# Patient Record
Sex: FEMALE | Race: WHITE | NOT HISPANIC OR LATINO | Employment: OTHER | URBAN - METROPOLITAN AREA
[De-identification: names, ages, dates, MRNs, and addresses within clinical notes are randomized per-mention and may not be internally consistent; named-entity substitution may affect disease eponyms.]

---

## 2023-03-21 ENCOUNTER — HOSPITAL ENCOUNTER (OUTPATIENT)
Facility: HOSPITAL | Age: 88
Setting detail: OBSERVATION
LOS: 2 days | Discharge: HOME | End: 2023-03-28
Attending: EMERGENCY MEDICINE | Admitting: HOSPITALIST
Payer: MEDICARE

## 2023-03-21 ENCOUNTER — APPOINTMENT (OUTPATIENT)
Dept: CT IMAGING | Facility: HOSPITAL | Age: 88
End: 2023-03-21
Payer: MEDICARE

## 2023-03-21 DIAGNOSIS — Z87.39 HISTORY OF RHEUMATOID ARTHRITIS: ICD-10-CM

## 2023-03-21 DIAGNOSIS — Z86.79 HISTORY OF HYPERTENSION: ICD-10-CM

## 2023-03-21 DIAGNOSIS — D72.829 LEUKOCYTOSIS, UNSPECIFIED TYPE: ICD-10-CM

## 2023-03-21 DIAGNOSIS — Z86.39 HISTORY OF DIABETES MELLITUS: ICD-10-CM

## 2023-03-21 DIAGNOSIS — K57.92 ACUTE DIVERTICULITIS: Primary | ICD-10-CM

## 2023-03-21 DIAGNOSIS — R10.32 LEFT LOWER QUADRANT ABDOMINAL PAIN: ICD-10-CM

## 2023-03-21 DIAGNOSIS — Z87.448 HISTORY OF CHRONIC KIDNEY DISEASE: ICD-10-CM

## 2023-03-21 LAB
ALBUMIN SERPL-MCNC: 3.9 G/DL (ref 3.5–5.2)
ALBUMIN/GLOB SERPL: 1.7 G/DL
ALP SERPL-CCNC: 67 U/L (ref 39–117)
ALT SERPL W P-5'-P-CCNC: 15 U/L (ref 1–33)
ANION GAP SERPL CALCULATED.3IONS-SCNC: 9.2 MMOL/L (ref 5–15)
AST SERPL-CCNC: 36 U/L (ref 1–32)
BACTERIA UR QL AUTO: NORMAL /HPF
BASOPHILS # BLD AUTO: 0.07 10*3/MM3 (ref 0–0.2)
BASOPHILS NFR BLD AUTO: 0.3 % (ref 0–1.5)
BILIRUB SERPL-MCNC: 0.5 MG/DL (ref 0–1.2)
BILIRUB UR QL STRIP: NEGATIVE
BUN SERPL-MCNC: 25 MG/DL (ref 8–23)
BUN/CREAT SERPL: 19.8 (ref 7–25)
CALCIUM SPEC-SCNC: 9.1 MG/DL (ref 8.6–10.5)
CHLORIDE SERPL-SCNC: 104 MMOL/L (ref 98–107)
CLARITY UR: CLEAR
CO2 SERPL-SCNC: 24.8 MMOL/L (ref 22–29)
COLOR UR: ABNORMAL
CREAT SERPL-MCNC: 1.26 MG/DL (ref 0.57–1)
D-LACTATE SERPL-SCNC: 1.8 MMOL/L (ref 0.5–2)
DEPRECATED RDW RBC AUTO: 44.7 FL (ref 37–54)
EGFRCR SERPLBLD CKD-EPI 2021: 41.7 ML/MIN/1.73
EOSINOPHIL # BLD AUTO: 0.15 10*3/MM3 (ref 0–0.4)
EOSINOPHIL NFR BLD AUTO: 0.7 % (ref 0.3–6.2)
ERYTHROCYTE [DISTWIDTH] IN BLOOD BY AUTOMATED COUNT: 13.4 % (ref 12.3–15.4)
GLOBULIN UR ELPH-MCNC: 2.3 GM/DL
GLUCOSE BLDC GLUCOMTR-MCNC: 128 MG/DL (ref 70–130)
GLUCOSE SERPL-MCNC: 148 MG/DL (ref 65–99)
GLUCOSE UR STRIP-MCNC: NEGATIVE MG/DL
HCT VFR BLD AUTO: 39.8 % (ref 34–46.6)
HGB BLD-MCNC: 13.4 G/DL (ref 12–15.9)
HGB UR QL STRIP.AUTO: NEGATIVE
HYALINE CASTS UR QL AUTO: NORMAL /LPF
KETONES UR QL STRIP: NEGATIVE
LEUKOCYTE ESTERASE UR QL STRIP.AUTO: NEGATIVE
LIPASE SERPL-CCNC: 40 U/L (ref 13–60)
LYMPHOCYTES # BLD AUTO: 0.95 10*3/MM3 (ref 0.7–3.1)
LYMPHOCYTES NFR BLD AUTO: 4.3 % (ref 19.6–45.3)
MAGNESIUM SERPL-MCNC: 2.1 MG/DL (ref 1.6–2.4)
MCH RBC QN AUTO: 31.2 PG (ref 26.6–33)
MCHC RBC AUTO-ENTMCNC: 33.7 G/DL (ref 31.5–35.7)
MCV RBC AUTO: 92.6 FL (ref 79–97)
MONOCYTES # BLD AUTO: 0.75 10*3/MM3 (ref 0.1–0.9)
MONOCYTES NFR BLD AUTO: 3.4 % (ref 5–12)
NEUTROPHILS NFR BLD AUTO: 20.14 10*3/MM3 (ref 1.7–7)
NEUTROPHILS NFR BLD AUTO: 90.7 % (ref 42.7–76)
NITRITE UR QL STRIP: NEGATIVE
PH UR STRIP.AUTO: 5.5 [PH] (ref 5–8)
PLATELET # BLD AUTO: 120 10*3/MM3 (ref 140–450)
PMV BLD AUTO: 10.3 FL (ref 6–12)
POTASSIUM SERPL-SCNC: 4.2 MMOL/L (ref 3.5–5.2)
PROT SERPL-MCNC: 6.2 G/DL (ref 6–8.5)
PROT UR QL STRIP: ABNORMAL
RBC # BLD AUTO: 4.3 10*6/MM3 (ref 3.77–5.28)
RBC # UR STRIP: NORMAL /HPF
REF LAB TEST METHOD: NORMAL
SODIUM SERPL-SCNC: 138 MMOL/L (ref 136–145)
SP GR UR STRIP: 1.02 (ref 1–1.03)
SQUAMOUS #/AREA URNS HPF: NORMAL /HPF
UROBILINOGEN UR QL STRIP: ABNORMAL
WBC # UR STRIP: NORMAL /HPF
WBC NRBC COR # BLD: 22.2 10*3/MM3 (ref 3.4–10.8)

## 2023-03-21 PROCEDURE — 25010000002 MORPHINE PER 10 MG: Performed by: EMERGENCY MEDICINE

## 2023-03-21 PROCEDURE — 99221 1ST HOSP IP/OBS SF/LOW 40: CPT | Performed by: SURGERY

## 2023-03-21 PROCEDURE — G0378 HOSPITAL OBSERVATION PER HR: HCPCS

## 2023-03-21 PROCEDURE — 87040 BLOOD CULTURE FOR BACTERIA: CPT | Performed by: EMERGENCY MEDICINE

## 2023-03-21 PROCEDURE — 82962 GLUCOSE BLOOD TEST: CPT

## 2023-03-21 PROCEDURE — 99284 EMERGENCY DEPT VISIT MOD MDM: CPT

## 2023-03-21 PROCEDURE — 83605 ASSAY OF LACTIC ACID: CPT | Performed by: EMERGENCY MEDICINE

## 2023-03-21 PROCEDURE — 80053 COMPREHEN METABOLIC PANEL: CPT | Performed by: EMERGENCY MEDICINE

## 2023-03-21 PROCEDURE — 83690 ASSAY OF LIPASE: CPT | Performed by: EMERGENCY MEDICINE

## 2023-03-21 PROCEDURE — 96365 THER/PROPH/DIAG IV INF INIT: CPT

## 2023-03-21 PROCEDURE — 81001 URINALYSIS AUTO W/SCOPE: CPT | Performed by: EMERGENCY MEDICINE

## 2023-03-21 PROCEDURE — 96375 TX/PRO/DX INJ NEW DRUG ADDON: CPT

## 2023-03-21 PROCEDURE — 74177 CT ABD & PELVIS W/CONTRAST: CPT

## 2023-03-21 PROCEDURE — P9612 CATHETERIZE FOR URINE SPEC: HCPCS

## 2023-03-21 PROCEDURE — 83735 ASSAY OF MAGNESIUM: CPT | Performed by: EMERGENCY MEDICINE

## 2023-03-21 PROCEDURE — 25510000001 IOPAMIDOL 61 % SOLUTION: Performed by: EMERGENCY MEDICINE

## 2023-03-21 PROCEDURE — 85025 COMPLETE CBC W/AUTO DIFF WBC: CPT | Performed by: EMERGENCY MEDICINE

## 2023-03-21 PROCEDURE — 96361 HYDRATE IV INFUSION ADD-ON: CPT

## 2023-03-21 PROCEDURE — 25010000002 PIPERACILLIN SOD-TAZOBACTAM PER 1 G: Performed by: EMERGENCY MEDICINE

## 2023-03-21 RX ORDER — PANTOPRAZOLE SODIUM 40 MG/1
40 TABLET, DELAYED RELEASE ORAL
Status: DISCONTINUED | OUTPATIENT
Start: 2023-03-22 | End: 2023-03-28 | Stop reason: HOSPADM

## 2023-03-21 RX ORDER — LOSARTAN POTASSIUM 50 MG/1
50 TABLET ORAL
Status: DISCONTINUED | OUTPATIENT
Start: 2023-03-22 | End: 2023-03-28 | Stop reason: HOSPADM

## 2023-03-21 RX ORDER — MEMANTINE HYDROCHLORIDE 5 MG/1
5 TABLET ORAL DAILY
Status: DISCONTINUED | OUTPATIENT
Start: 2023-03-22 | End: 2023-03-28 | Stop reason: HOSPADM

## 2023-03-21 RX ORDER — LEVOTHYROXINE SODIUM 0.03 MG/1
25 TABLET ORAL
Status: DISCONTINUED | OUTPATIENT
Start: 2023-03-22 | End: 2023-03-28 | Stop reason: HOSPADM

## 2023-03-21 RX ORDER — CALCITRIOL 0.25 UG/1
0.25 CAPSULE, LIQUID FILLED ORAL DAILY
Status: DISCONTINUED | OUTPATIENT
Start: 2023-03-22 | End: 2023-03-22

## 2023-03-21 RX ORDER — ONDANSETRON 4 MG/1
4 TABLET, FILM COATED ORAL EVERY 6 HOURS PRN
Status: DISCONTINUED | OUTPATIENT
Start: 2023-03-21 | End: 2023-03-28 | Stop reason: HOSPADM

## 2023-03-21 RX ORDER — MEMANTINE HYDROCHLORIDE 5 MG/1
5 TABLET ORAL 2 TIMES DAILY
COMMUNITY

## 2023-03-21 RX ORDER — AMLODIPINE BESYLATE 5 MG/1
5 TABLET ORAL DAILY
COMMUNITY

## 2023-03-21 RX ORDER — INSULIN LISPRO 100 [IU]/ML
0-9 INJECTION, SOLUTION INTRAVENOUS; SUBCUTANEOUS
Status: DISCONTINUED | OUTPATIENT
Start: 2023-03-22 | End: 2023-03-28 | Stop reason: HOSPADM

## 2023-03-21 RX ORDER — IBUPROFEN 600 MG/1
1 TABLET ORAL
Status: DISCONTINUED | OUTPATIENT
Start: 2023-03-21 | End: 2023-03-28 | Stop reason: HOSPADM

## 2023-03-21 RX ORDER — ACETAMINOPHEN 325 MG/1
650 TABLET ORAL EVERY 4 HOURS PRN
Status: DISCONTINUED | OUTPATIENT
Start: 2023-03-21 | End: 2023-03-28 | Stop reason: HOSPADM

## 2023-03-21 RX ORDER — ALBUTEROL SULFATE 2.5 MG/3ML
2.5 SOLUTION RESPIRATORY (INHALATION) EVERY 6 HOURS PRN
Status: DISCONTINUED | OUTPATIENT
Start: 2023-03-21 | End: 2023-03-28 | Stop reason: HOSPADM

## 2023-03-21 RX ORDER — LEFLUNOMIDE 20 MG/1
20 TABLET ORAL DAILY
Status: DISCONTINUED | OUTPATIENT
Start: 2023-03-22 | End: 2023-03-22

## 2023-03-21 RX ORDER — NICOTINE POLACRILEX 4 MG
15 LOZENGE BUCCAL
Status: DISCONTINUED | OUTPATIENT
Start: 2023-03-21 | End: 2023-03-28 | Stop reason: HOSPADM

## 2023-03-21 RX ORDER — ONDANSETRON 2 MG/ML
4 INJECTION INTRAMUSCULAR; INTRAVENOUS EVERY 6 HOURS PRN
Status: DISCONTINUED | OUTPATIENT
Start: 2023-03-21 | End: 2023-03-28 | Stop reason: HOSPADM

## 2023-03-21 RX ORDER — LEVOTHYROXINE SODIUM 0.03 MG/1
25 TABLET ORAL
COMMUNITY

## 2023-03-21 RX ORDER — SODIUM CHLORIDE 9 MG/ML
75 INJECTION, SOLUTION INTRAVENOUS CONTINUOUS
Status: DISCONTINUED | OUTPATIENT
Start: 2023-03-21 | End: 2023-03-24

## 2023-03-21 RX ORDER — AMLODIPINE BESYLATE 5 MG/1
5 TABLET ORAL
Status: DISCONTINUED | OUTPATIENT
Start: 2023-03-22 | End: 2023-03-28 | Stop reason: HOSPADM

## 2023-03-21 RX ORDER — MORPHINE SULFATE 2 MG/ML
2 INJECTION, SOLUTION INTRAMUSCULAR; INTRAVENOUS ONCE
Status: COMPLETED | OUTPATIENT
Start: 2023-03-21 | End: 2023-03-21

## 2023-03-21 RX ORDER — TIZANIDINE 4 MG/1
2 TABLET ORAL EVERY 6 HOURS PRN
COMMUNITY

## 2023-03-21 RX ORDER — TIZANIDINE 4 MG/1
4 TABLET ORAL EVERY 8 HOURS PRN
Status: DISCONTINUED | OUTPATIENT
Start: 2023-03-21 | End: 2023-03-28 | Stop reason: HOSPADM

## 2023-03-21 RX ORDER — UREA 10 %
3 LOTION (ML) TOPICAL NIGHTLY PRN
Status: DISCONTINUED | OUTPATIENT
Start: 2023-03-21 | End: 2023-03-28 | Stop reason: HOSPADM

## 2023-03-21 RX ORDER — ATORVASTATIN CALCIUM 20 MG/1
20 TABLET, FILM COATED ORAL NIGHTLY
Status: DISCONTINUED | OUTPATIENT
Start: 2023-03-21 | End: 2023-03-28 | Stop reason: HOSPADM

## 2023-03-21 RX ORDER — ASPIRIN 325 MG
325 TABLET, DELAYED RELEASE (ENTERIC COATED) ORAL DAILY
Status: DISCONTINUED | OUTPATIENT
Start: 2023-03-22 | End: 2023-03-22

## 2023-03-21 RX ORDER — DEXTROSE MONOHYDRATE 25 G/50ML
25 INJECTION, SOLUTION INTRAVENOUS
Status: DISCONTINUED | OUTPATIENT
Start: 2023-03-21 | End: 2023-03-28 | Stop reason: HOSPADM

## 2023-03-21 RX ADMIN — ATORVASTATIN CALCIUM 20 MG: 20 TABLET, FILM COATED ORAL at 22:13

## 2023-03-21 RX ADMIN — MORPHINE SULFATE 2 MG: 2 INJECTION, SOLUTION INTRAMUSCULAR; INTRAVENOUS at 19:41

## 2023-03-21 RX ADMIN — SODIUM CHLORIDE 75 ML/HR: 9 INJECTION, SOLUTION INTRAVENOUS at 22:13

## 2023-03-21 RX ADMIN — TAZOBACTAM SODIUM AND PIPERACILLIN SODIUM 3.38 G: 375; 3 INJECTION, SOLUTION INTRAVENOUS at 19:00

## 2023-03-21 RX ADMIN — METOPROLOL TARTRATE 125 MG: 25 TABLET, FILM COATED ORAL at 22:13

## 2023-03-21 RX ADMIN — IOPAMIDOL 100 ML: 612 INJECTION, SOLUTION INTRAVENOUS at 18:27

## 2023-03-21 RX ADMIN — Medication 3 MG: at 22:12

## 2023-03-21 NOTE — ED PROVIDER NOTES
EMERGENCY DEPARTMENT ENCOUNTER    Room Number:  N540/1  Date of encounter:  3/21/2023  PCP: Sabi Solis MD  Historian: Patient      HPI:  Chief Complaint: Abdominal pain  A complete HPI/ROS/PMH/PSH/SH/FH are unobtainable due to: None    Context: Martina Lara is a 87 y.o. female who presents to the ED via private vehicle for evaluation of 1 day of left lower quadrant abdominal pain, constant nature with no aggravating or alleviating factors.  Denies any new dysuria or urinary urgency or frequency, no diarrhea, no nausea or vomiting.  Had a soft large bowel movement earlier today with no aggravation or alleviation of pain.  Has been eating and drinking normally.  No fevers, cough, chest pain, shortness of breath.  Reports a prior partial colon resection in the past.  States that she took Tylenol earlier today with no significant relief.      MEDICAL RECORD REVIEW    External (non-ED) record review: Primary care office visit note reviewed from February 17, 2023, patient with a history of hypertension, stage IV chronic kidney disease, diabetes, rheumatoid arthritis on Plaquenil, atrial fibrillation, history of memory issues.     PAST MEDICAL HISTORY  Active Ambulatory Problems     Diagnosis Date Noted   • No Active Ambulatory Problems     Resolved Ambulatory Problems     Diagnosis Date Noted   • No Resolved Ambulatory Problems     No Additional Past Medical History         PAST SURGICAL HISTORY  No past surgical history on file.      FAMILY HISTORY  No family history on file.      SOCIAL HISTORY  Social History     Socioeconomic History   • Marital status: Single         ALLERGIES  Patient has no known allergies.        REVIEW OF SYSTEMS  Review of Systems     All systems reviewed and negative except for those discussed in HPI.       PHYSICAL EXAM    I have reviewed the triage vital signs and nursing notes.    ED Triage Vitals   Temp Heart Rate Resp BP SpO2   03/21/23 1652 03/21/23 1652 03/21/23 1652 03/21/23  1715 03/21/23 1652   98.4 °F (36.9 °C) 90 18 167/97 98 %      Temp src Heart Rate Source Patient Position BP Location FiO2 (%)   -- -- 03/21/23 1715 03/21/23 1715 --     Lying Right arm        Physical Exam  General: No acute distress, nontoxic,, nondiaphoretic  HEENT: Mucous membranes moist, atraumatic, EOMI  Neck: Full ROM  Pulm: Symmetric chest rise, nonlabored, lungs CTAB  Cardiovascular: Regular rate and rhythm, intact distal pulses  GI: Soft, mild left lower quadrant tenderness to palpation, nondistended, no rebound, no guarding, bowel sounds present  MSK: Full ROM, no deformity  Skin: Warm, dry  Neuro: Awake, alert, oriented x 4, GCS 15, moving all extremities, no focal deficits  Psych: Calm, cooperative      N95, protective eye goggles, and gloves used during this encounter. Patient in surgical mask.      LAB RESULTS  Recent Results (from the past 24 hour(s))   Comprehensive Metabolic Panel    Collection Time: 03/21/23  5:13 PM    Specimen: Blood   Result Value Ref Range    Glucose 148 (H) 65 - 99 mg/dL    BUN 25 (H) 8 - 23 mg/dL    Creatinine 1.26 (H) 0.57 - 1.00 mg/dL    Sodium 138 136 - 145 mmol/L    Potassium 4.2 3.5 - 5.2 mmol/L    Chloride 104 98 - 107 mmol/L    CO2 24.8 22.0 - 29.0 mmol/L    Calcium 9.1 8.6 - 10.5 mg/dL    Total Protein 6.2 6.0 - 8.5 g/dL    Albumin 3.9 3.5 - 5.2 g/dL    ALT (SGPT) 15 1 - 33 U/L    AST (SGOT) 36 (H) 1 - 32 U/L    Alkaline Phosphatase 67 39 - 117 U/L    Total Bilirubin 0.5 0.0 - 1.2 mg/dL    Globulin 2.3 gm/dL    A/G Ratio 1.7 g/dL    BUN/Creatinine Ratio 19.8 7.0 - 25.0    Anion Gap 9.2 5.0 - 15.0 mmol/L    eGFR 41.7 (L) >60.0 mL/min/1.73   Lipase    Collection Time: 03/21/23  5:13 PM    Specimen: Blood   Result Value Ref Range    Lipase 40 13 - 60 U/L   Lactic Acid, Plasma    Collection Time: 03/21/23  5:13 PM    Specimen: Blood   Result Value Ref Range    Lactate 1.8 0.5 - 2.0 mmol/L   Magnesium    Collection Time: 03/21/23  5:13 PM    Specimen: Blood   Result Value  Ref Range    Magnesium 2.1 1.6 - 2.4 mg/dL   CBC Auto Differential    Collection Time: 03/21/23  5:13 PM    Specimen: Blood   Result Value Ref Range    WBC 22.20 (H) 3.40 - 10.80 10*3/mm3    RBC 4.30 3.77 - 5.28 10*6/mm3    Hemoglobin 13.4 12.0 - 15.9 g/dL    Hematocrit 39.8 34.0 - 46.6 %    MCV 92.6 79.0 - 97.0 fL    MCH 31.2 26.6 - 33.0 pg    MCHC 33.7 31.5 - 35.7 g/dL    RDW 13.4 12.3 - 15.4 %    RDW-SD 44.7 37.0 - 54.0 fl    MPV 10.3 6.0 - 12.0 fL    Platelets 120 (L) 140 - 450 10*3/mm3    Neutrophil % 90.7 (H) 42.7 - 76.0 %    Lymphocyte % 4.3 (L) 19.6 - 45.3 %    Monocyte % 3.4 (L) 5.0 - 12.0 %    Eosinophil % 0.7 0.3 - 6.2 %    Basophil % 0.3 0.0 - 1.5 %    Neutrophils, Absolute 20.14 (H) 1.70 - 7.00 10*3/mm3    Lymphocytes, Absolute 0.95 0.70 - 3.10 10*3/mm3    Monocytes, Absolute 0.75 0.10 - 0.90 10*3/mm3    Eosinophils, Absolute 0.15 0.00 - 0.40 10*3/mm3    Basophils, Absolute 0.07 0.00 - 0.20 10*3/mm3   Urinalysis With Microscopic If Indicated (No Culture) - Straight Cath    Collection Time: 03/21/23  5:47 PM    Specimen: Straight Cath; Urine   Result Value Ref Range    Color, UA Dark Yellow (A) Yellow, Straw    Appearance, UA Clear Clear    pH, UA 5.5 5.0 - 8.0    Specific Gravity, UA 1.022 1.005 - 1.030    Glucose, UA Negative Negative    Ketones, UA Negative Negative    Bilirubin, UA Negative Negative    Blood, UA Negative Negative    Protein, UA 30 mg/dL (1+) (A) Negative    Leuk Esterase, UA Negative Negative    Nitrite, UA Negative Negative    Urobilinogen, UA 0.2 E.U./dL 0.2 - 1.0 E.U./dL   Urinalysis, Microscopic Only - Straight Cath    Collection Time: 03/21/23  5:47 PM    Specimen: Straight Cath; Urine   Result Value Ref Range    RBC, UA 0-2 None Seen, 0-2 /HPF    WBC, UA 0-2 None Seen, 0-2 /HPF    Bacteria, UA None Seen None Seen /HPF    Squamous Epithelial Cells, UA 0-2 None Seen, 0-2 /HPF    Hyaline Casts, UA 0-2 None Seen /LPF    Methodology Automated Microscopy    POC Glucose Once     Collection Time: 03/21/23  8:47 PM    Specimen: Blood   Result Value Ref Range    Glucose 128 70 - 130 mg/dL       Ordered the above labs and independently interpreted results. My findings will be discussed in the medical decision making section below        RADIOLOGY  CT Abdomen Pelvis With Contrast    Result Date: 3/21/2023  CT ABDOMEN PELVIS W CONTRAST-  INDICATIONS: Left lower quadrant pain  TECHNIQUE: Radiation dose reduction techniques were utilized, including automated exposure control and exposure modulation based on body size. Enhanced ABDOMEN AND PELVIS CT  COMPARISON: None available  FINDINGS:  Mild nonspecific thickening of the adrenal glands.  The gallbladder is surgically absent.  A 1.1 cm left renal low density on axial image 52 shows greater density than expected for simple cyst, could be hyperdense cyst, or potentially solid lesion, further evaluation with renal MRI or ultrasound can be obtained, interval follow-up can characterize change.  There appears to be an indeterminate hypoenhancing focus, versus volume averaging, at the pancreatic body, 1 cm on axial image 34, possibly neoplasm not excluded, MRCP correlation could be obtained, interval follow-up could characterize change.  Otherwise unremarkable appearance of the liver, spleen, adrenal glands, pancreas, kidneys, bladder.  No bowel obstruction. Fairly extensive small bowel diverticulosis is present with small bowel diverticulitis in the left aspect of the abdomen, evidence of microperforation with small extraluminal gas in the left aspect of the abdomen on axial image 66. Colonic diverticula are seen that do not appear inflamed.  A periumbilical hernia of fat is seen.    Scattered small mesenteric and para-aortic lymph nodes are seen that are not significant by size criteria.  Abdominal aorta is not aneurysmal. Aortic and other arterial calcifications are present.  The lung bases are clear.  Degenerative changes are seen in the spine.  Chronic appearing L1 compression deformity.          1. Critical test result. Small bowel diverticulitis with microperforation. 2 indeterminate pancreatic and left renal lesions, as described above.  Discussed by telephone with Dr. Douglass at 1841, 03/21/2023.  This report was finalized on 3/21/2023 6:43 PM by Dr. Myke Simon M.D.        Ordered the above noted radiological studies.  Independently interpreted by me and my independent review of findings can be found in the ED Course.  See dictation for official radiology interpretation.      PROCEDURES    Procedures      MEDICATIONS GIVEN IN ER    Medications   acetaminophen (TYLENOL) tablet 650 mg (has no administration in time range)   ondansetron (ZOFRAN) tablet 4 mg (has no administration in time range)     Or   ondansetron (ZOFRAN) injection 4 mg (has no administration in time range)   melatonin tablet 3 mg (has no administration in time range)   piperacillin-tazobactam (ZOSYN) 3.375 g in iso-osmotic dextrose 50 ml (premix) (has no administration in time range)   sodium chloride 0.9 % infusion (has no administration in time range)   dextrose (GLUTOSE) oral gel 15 g (has no administration in time range)   dextrose (D50W) (25 g/50 mL) IV injection 25 g (has no administration in time range)   glucagon (GLUCAGEN) injection 1 mg (has no administration in time range)   insulin lispro (ADMELOG) injection 0-9 Units (has no administration in time range)   metoprolol tartrate (LOPRESSOR) tablet 25 mg (has no administration in time range)   amLODIPine (NORVASC) tablet 5 mg (has no administration in time range)   aspirin EC tablet 325 mg (has no administration in time range)   calcitriol (ROCALTROL) capsule 0.25 mcg (has no administration in time range)   leflunomide (ARAVA) tablet 20 mg (has no administration in time range)   levothyroxine (SYNTHROID, LEVOTHROID) tablet 25 mcg (has no administration in time range)   memantine (NAMENDA) tablet 5 mg (has no  administration in time range)   pantoprazole (PROTONIX) EC tablet 40 mg (has no administration in time range)   atorvastatin (LIPITOR) tablet 20 mg (has no administration in time range)   tiZANidine (ZANAFLEX) tablet 4 mg (has no administration in time range)   losartan (COZAAR) tablet 50 mg (has no administration in time range)   albuterol (PROVENTIL) nebulizer solution 0.083% 2.5 mg/3mL (has no administration in time range)   iopamidol (ISOVUE-300) 61 % injection 100 mL (100 mL Intravenous Given 3/21/23 1827)   piperacillin-tazobactam (ZOSYN) 3.375 g in iso-osmotic dextrose 50 ml (premix) (0 g Intravenous Stopped 3/21/23 1940)   morphine injection 2 mg (2 mg Intravenous Given 3/21/23 1941)         PROGRESS, DATA ANALYSIS, CONSULTS, AND MEDICAL DECISION MAKING    Please note that this section constitutes my independent interpretation of clinical data including lab results, radiology, EKG's.  This constitutes my independent professional opinion regarding differential diagnosis and management of this patient.  It may include any factors such as history from outside sources, review of external records, social determinants of health, management of medications, response to those treatments, and discussions with other providers.    Differential Diagnosis and Plan: Initial concern for bowel obstruction, constipation, diverticulitis, UTI, acute on chronic renal failure, colitis, kidney stone, among others.  [place] for labs, CT scan, and reevaluation with results.  She is declining pain medications at this time.    Additional sources:  - Discussed/ obtained information from independent historians:   None     - Chronic or social conditions impacting care: None     - Shared decision making:  Patient fully updated on and in agreement with the course and plan moving forward    ED Course as of 03/21/23 2211   Tue Mar 21, 2023   1805 WBC(!): 22.20 [DC]   1805 Hemoglobin: 13.4 [DC]   1805 Platelets(!): 120 [DC]   1805 Neutrophil Rel  %(!): 90.7 [DC]   1805 Glucose(!): 148 [DC]   1805 BUN(!): 25 [DC]   1805 Creatinine(!): 1.26  1.43 ten months ago [DC]   1805 Sodium: 138 [DC]   1806 Potassium: 4.2 [DC]   1806 ALT (SGPT): 15 [DC]   1806 AST (SGOT)(!): 36 [DC]   1806 Alkaline Phosphatase: 67 [DC]   1806 Total Bilirubin: 0.5 [DC]   1806 Lactate: 1.8 [DC]   1806 Nitrite, UA: Negative [DC]   1806 Leukocytes, UA: Negative [DC]   1806 Blood, UA: Negative [DC]   1806 Ketones, UA: Negative [DC]   1806 Bacteria, UA: None Seen [DC]   1806 WBC, UA: 0-2 [DC]   1806 RBC, UA: 0-2 [DC]   1843 I discussed CT scan with Radiologist Dr. Simon, discussed patient has left-sided diverticulitis with evidence of microperforation.  Unspecified lesions of kidneys and pancreas.  [DC]   1918 Discussed with Dr. Miranda, General Surgery, discussed patient's clinical course and findings today, treatment modalities, and he will consult [DC]   1947 Discussed with Dr. Sagastume, [facility] discussed patient's clinical course and findings today, treatment modalities, surgical consult, and need for hospitalization.  [DC]      ED Course User Index  [DC] Jakob Douglass MD       Hospitalization Considered?: yes    Orders Placed During This Visit:  Orders Placed This Encounter   Procedures   • Blood Culture - Blood,   • Blood Culture - Blood,   • CT Abdomen Pelvis With Contrast   • Comprehensive Metabolic Panel   • Lipase   • Urinalysis With Microscopic If Indicated (No Culture) - Urine, Catheter   • Lactic Acid, Plasma   • Magnesium   • CBC Auto Differential   • Urinalysis, Microscopic Only - Urine, Clean Catch   • Basic Metabolic Panel   • CBC (No Diff)   • Hemoglobin A1c   • Diet: Liquid Diets; Clear Liquid; Texture: Regular Texture (IDDSI 7); Fluid Consistency: Thin (IDDSI 0)   • Vital Signs   • Cardiac Monitoring   • Up with assistance   • Daily Weights   • Strict Intake & Output   • Oral Care   • Place Sequential Compression Device   • Maintain Sequential Compression Device   • Do NOT  Hold Basal or Correction Scale Insulin When Patient is NPO, Hold Scheduled Mealtime ([place]ol[place]) Insulin if NPO   • Code Status and Medical Interventions:   • Surgery (on-call MD unless specified)   • LHA (on-call MD unless specified) Details   • POC Glucose Once   • POC Glucose TID AC   • Initiate Observation Status   • CBC & Differential       Additional orders considered but not placed:      Independent interpretation of labs, radiology studies, and discussions with consultants: See ED Course        AS OF 22:11 EDT VITALS:    BP - 160/95  HR - 97  TEMP - 98.1 °F (36.7 °C) (Oral)  02 SATS - 97%        DIAGNOSIS  Final diagnoses:   Acute diverticulitis   Leukocytosis, unspecified type   Left lower quadrant abdominal pain   History of diabetes mellitus   History of chronic kidney disease   History of hypertension   History of rheumatoid arthritis         DISPOSITION  HOSPITALIZATION    Discussed treatment plan and reason for hospitalization with pt/family and hospitalizing physician.  Pt/family voiced understanding of the plan for hospitalization for further testing/treatment as needed.                   --    Please note that portions of this were completed with a voice recognition program.       Note Disclaimer: At [facility], we believe that sharing information builds trust and better relationships. You are receiving this note because you are receiving care at [facility] or recently visited. It is possible you will see health information before a provider has talked with you about it. This kind of information can be easy to misunderstand. To help you fully understand what it means for your health, we urge you to discuss this note with your provider.         Jakob Douglass MD  03/21/23 2569

## 2023-03-21 NOTE — ED TRIAGE NOTES
Patient to ER via car from home for low abd pain x a few days  Denies any n/v/d    Patient wearing mask this RN in PPE

## 2023-03-21 NOTE — ED NOTES
"Nursing report ED to floor  Martina Lara  87 y.o.  female    HPI :   Chief Complaint   Patient presents with    Abdominal Pain       Admitting doctor:   Kassidy Sagastume MD    Admitting diagnosis:   The primary encounter diagnosis was Acute diverticulitis. Diagnoses of Leukocytosis, unspecified type, Left lower quadrant abdominal pain, History of diabetes mellitus, History of chronic kidney disease, History of hypertension, and History of rheumatoid arthritis were also pertinent to this visit.    Code status:   Current Code Status       Date Active Code Status Order ID Comments User Context       Not on file            Allergies:   Patient has no known allergies.    Isolation:   No active isolations    Intake and Output    Intake/Output Summary (Last 24 hours) at 3/21/2023 1951  Last data filed at 3/21/2023 1940  Gross per 24 hour   Intake 50 ml   Output --   Net 50 ml       Weight:       03/21/23 1714   Weight: 70.7 kg (155 lb 14.4 oz)       Most recent vitals:   Vitals:    03/21/23 1714 03/21/23 1715 03/1936 03/21/23 1937   BP:  167/97 146/85    BP Location:  Right arm     Patient Position:  Lying     Pulse:   80    Resp:       Temp:       SpO2:    93%   Weight: 70.7 kg (155 lb 14.4 oz)      Height: 165.1 cm (65\")          Active LDAs/IV Access:   Lines, Drains & Airways       Active LDAs       Name Placement date Placement time Site Days    Peripheral IV 03/21/23 1818 Anterior;Left Forearm 03/21/23 1818  Forearm  less than 1                    Labs (abnormal labs have a star):   Labs Reviewed   COMPREHENSIVE METABOLIC PANEL - Abnormal; Notable for the following components:       Result Value    Glucose 148 (*)     BUN 25 (*)     Creatinine 1.26 (*)     AST (SGOT) 36 (*)     eGFR 41.7 (*)     All other components within normal limits    Narrative:     GFR Normal >60  Chronic Kidney Disease <60  Kidney Failure <15    The GFR formula is only valid for adults with stable renal function between ages " 18 and 70.   URINALYSIS W/ MICROSCOPIC IF INDICATED (NO CULTURE) - Abnormal; Notable for the following components:    Color, UA Dark Yellow (*)     Protein, UA 30 mg/dL (1+) (*)     All other components within normal limits   CBC WITH AUTO DIFFERENTIAL - Abnormal; Notable for the following components:    WBC 22.20 (*)     Platelets 120 (*)     Neutrophil % 90.7 (*)     Lymphocyte % 4.3 (*)     Monocyte % 3.4 (*)     Neutrophils, Absolute 20.14 (*)     All other components within normal limits   LIPASE - Normal   LACTIC ACID, PLASMA - Normal   MAGNESIUM - Normal   BLOOD CULTURE   BLOOD CULTURE   URINALYSIS, MICROSCOPIC ONLY   CBC AND DIFFERENTIAL    Narrative:     The following orders were created for panel order CBC & Differential.  Procedure                               Abnormality         Status                     ---------                               -----------         ------                     CBC Auto Differential[[id number]]        Abnormal            Final result                 Please view results for these tests on the individual orders.       EKG:   No orders to display       Meds given in ED:   Medications   iopamidol (ISOVUE-300) 61 % injection 100 mL (100 mL Intravenous Given 3/21/23 1827)   piperacillin-tazobactam (ZOSYN) 3.375 g in iso-osmotic dextrose 50 ml (premix) (0 g Intravenous Stopped 3/21/23 1940)   morphine injection 2 mg (2 mg Intravenous Given 3/21/23 1941)       Imaging results:  CT Abdomen Pelvis With Contrast    Result Date: 3/21/2023    1. Critical test result. Small bowel diverticulitis with microperforation. 2 indeterminate pancreatic and left renal lesions, as described above.  Discussed by telephone with Dr. Douglass at 1841, 03/21/2023.  This report was finalized on 3/21/2023 6:43 PM by Dr. Myke Simon M.D.       Ambulatory status:   - assist x1    Social issues:   Social History     Socioeconomic History    Marital status: Single       NIH Stroke Scale:         [place]  SISI Bryant  03/21/23 19:51 EDT

## 2023-03-22 ENCOUNTER — APPOINTMENT (OUTPATIENT)
Dept: ULTRASOUND IMAGING | Facility: HOSPITAL | Age: 88
End: 2023-03-22
Payer: MEDICARE

## 2023-03-22 PROBLEM — K57.80 DIVERTICULAR DISEASE OF INTESTINE WITH PERFORATION AND ABSCESS: Status: ACTIVE | Noted: 2023-03-22

## 2023-03-22 PROBLEM — E11.22 TYPE 2 DIABETES MELLITUS WITH STAGE 3B CHRONIC KIDNEY DISEASE, WITHOUT LONG-TERM CURRENT USE OF INSULIN: Status: ACTIVE | Noted: 2023-03-22

## 2023-03-22 PROBLEM — E11.65 TYPE 2 DIABETES MELLITUS WITH HYPERGLYCEMIA, WITHOUT LONG-TERM CURRENT USE OF INSULIN: Status: ACTIVE | Noted: 2023-03-22

## 2023-03-22 PROBLEM — N18.32 STAGE 3B CHRONIC KIDNEY DISEASE: Status: ACTIVE | Noted: 2023-03-22

## 2023-03-22 PROBLEM — M06.9 ATROPHIC ARTHRITIS: Status: ACTIVE | Noted: 2023-03-22

## 2023-03-22 PROBLEM — N18.32 TYPE 2 DIABETES MELLITUS WITH STAGE 3B CHRONIC KIDNEY DISEASE, WITHOUT LONG-TERM CURRENT USE OF INSULIN: Status: ACTIVE | Noted: 2023-03-22

## 2023-03-22 PROBLEM — N28.9 LESION OF LEFT NATIVE KIDNEY: Status: ACTIVE | Noted: 2023-03-22

## 2023-03-22 PROBLEM — I10 HYPERTENSION: Status: ACTIVE | Noted: 2023-03-22

## 2023-03-22 PROBLEM — I25.10 CAD (CORONARY ARTERY DISEASE): Status: ACTIVE | Noted: 2023-03-22

## 2023-03-22 PROBLEM — D69.6 THROMBOCYTOPENIA: Status: ACTIVE | Noted: 2023-03-22

## 2023-03-22 PROBLEM — I48.19 PERSISTENT ATRIAL FIBRILLATION: Status: ACTIVE | Noted: 2023-03-22

## 2023-03-22 LAB
ANION GAP SERPL CALCULATED.3IONS-SCNC: 8 MMOL/L (ref 5–15)
BUN SERPL-MCNC: 22 MG/DL (ref 8–23)
BUN/CREAT SERPL: 18 (ref 7–25)
CALCIUM SPEC-SCNC: 8 MG/DL (ref 8.6–10.5)
CHLORIDE SERPL-SCNC: 107 MMOL/L (ref 98–107)
CO2 SERPL-SCNC: 25 MMOL/L (ref 22–29)
CREAT SERPL-MCNC: 1.22 MG/DL (ref 0.57–1)
DEPRECATED RDW RBC AUTO: 45.4 FL (ref 37–54)
EGFRCR SERPLBLD CKD-EPI 2021: 43.3 ML/MIN/1.73
ERYTHROCYTE [DISTWIDTH] IN BLOOD BY AUTOMATED COUNT: 13.3 % (ref 12.3–15.4)
GEN 5 2HR TROPONIN T REFLEX: 67 NG/L
GLUCOSE BLDC GLUCOMTR-MCNC: 108 MG/DL (ref 70–130)
GLUCOSE BLDC GLUCOMTR-MCNC: 119 MG/DL (ref 70–130)
GLUCOSE BLDC GLUCOMTR-MCNC: 137 MG/DL (ref 70–130)
GLUCOSE SERPL-MCNC: 119 MG/DL (ref 65–99)
HBA1C MFR BLD: 5.6 % (ref 4.8–5.6)
HCT VFR BLD AUTO: 36.2 % (ref 34–46.6)
HGB BLD-MCNC: 12.2 G/DL (ref 12–15.9)
MCH RBC QN AUTO: 31.3 PG (ref 26.6–33)
MCHC RBC AUTO-ENTMCNC: 33.7 G/DL (ref 31.5–35.7)
MCV RBC AUTO: 92.8 FL (ref 79–97)
PLATELET # BLD AUTO: 89 10*3/MM3 (ref 140–450)
PMV BLD AUTO: 10.6 FL (ref 6–12)
POTASSIUM SERPL-SCNC: 3.6 MMOL/L (ref 3.5–5.2)
QT INTERVAL: 381 MS
RBC # BLD AUTO: 3.9 10*6/MM3 (ref 3.77–5.28)
SODIUM SERPL-SCNC: 140 MMOL/L (ref 136–145)
TROPONIN T DELTA: -3 NG/L
TROPONIN T SERPL HS-MCNC: 70 NG/L
WBC NRBC COR # BLD: 14.55 10*3/MM3 (ref 3.4–10.8)

## 2023-03-22 PROCEDURE — 82962 GLUCOSE BLOOD TEST: CPT

## 2023-03-22 PROCEDURE — 83036 HEMOGLOBIN GLYCOSYLATED A1C: CPT | Performed by: INTERNAL MEDICINE

## 2023-03-22 PROCEDURE — 84484 ASSAY OF TROPONIN QUANT: CPT | Performed by: NURSE PRACTITIONER

## 2023-03-22 PROCEDURE — 80048 BASIC METABOLIC PNL TOTAL CA: CPT | Performed by: INTERNAL MEDICINE

## 2023-03-22 PROCEDURE — 96361 HYDRATE IV INFUSION ADD-ON: CPT

## 2023-03-22 PROCEDURE — 93005 ELECTROCARDIOGRAM TRACING: CPT | Performed by: HOSPITALIST

## 2023-03-22 PROCEDURE — 93010 ELECTROCARDIOGRAM REPORT: CPT | Performed by: INTERNAL MEDICINE

## 2023-03-22 PROCEDURE — 76775 US EXAM ABDO BACK WALL LIM: CPT

## 2023-03-22 PROCEDURE — 25010000002 PIPERACILLIN SOD-TAZOBACTAM PER 1 G: Performed by: INTERNAL MEDICINE

## 2023-03-22 PROCEDURE — 36415 COLL VENOUS BLD VENIPUNCTURE: CPT | Performed by: INTERNAL MEDICINE

## 2023-03-22 PROCEDURE — 99232 SBSQ HOSP IP/OBS MODERATE 35: CPT | Performed by: SURGERY

## 2023-03-22 PROCEDURE — 96366 THER/PROPH/DIAG IV INF ADDON: CPT

## 2023-03-22 PROCEDURE — 85027 COMPLETE CBC AUTOMATED: CPT | Performed by: INTERNAL MEDICINE

## 2023-03-22 RX ADMIN — AMLODIPINE BESYLATE 5 MG: 5 TABLET ORAL at 08:25

## 2023-03-22 RX ADMIN — SODIUM CHLORIDE 75 ML/HR: 9 INJECTION, SOLUTION INTRAVENOUS at 10:32

## 2023-03-22 RX ADMIN — TAZOBACTAM SODIUM AND PIPERACILLIN SODIUM 3.38 G: 375; 3 INJECTION, SOLUTION INTRAVENOUS at 08:25

## 2023-03-22 RX ADMIN — TAZOBACTAM SODIUM AND PIPERACILLIN SODIUM 3.38 G: 375; 3 INJECTION, SOLUTION INTRAVENOUS at 00:59

## 2023-03-22 RX ADMIN — LOSARTAN POTASSIUM 50 MG: 50 TABLET, FILM COATED ORAL at 08:25

## 2023-03-22 RX ADMIN — PANTOPRAZOLE SODIUM 40 MG: 40 TABLET, DELAYED RELEASE ORAL at 05:19

## 2023-03-22 RX ADMIN — LEFLUNOMIDE 20 MG: 20 TABLET ORAL at 08:25

## 2023-03-22 RX ADMIN — SODIUM CHLORIDE 75 ML/HR: 9 INJECTION, SOLUTION INTRAVENOUS at 22:37

## 2023-03-22 RX ADMIN — CALCITRIOL 0.25 MCG: 0.25 CAPSULE ORAL at 08:25

## 2023-03-22 RX ADMIN — TAZOBACTAM SODIUM AND PIPERACILLIN SODIUM 3.38 G: 375; 3 INJECTION, SOLUTION INTRAVENOUS at 18:21

## 2023-03-22 RX ADMIN — Medication 3 MG: at 20:17

## 2023-03-22 RX ADMIN — ACETAMINOPHEN 650 MG: 325 TABLET, FILM COATED ORAL at 08:28

## 2023-03-22 RX ADMIN — ATORVASTATIN CALCIUM 20 MG: 20 TABLET, FILM COATED ORAL at 20:17

## 2023-03-22 RX ADMIN — METOPROLOL TARTRATE 25 MG: 25 TABLET, FILM COATED ORAL at 08:25

## 2023-03-22 RX ADMIN — MEMANTINE HYDROCHLORIDE 5 MG: 5 TABLET, FILM COATED ORAL at 08:25

## 2023-03-22 RX ADMIN — METOPROLOL TARTRATE 25 MG: 25 TABLET, FILM COATED ORAL at 20:17

## 2023-03-22 RX ADMIN — LEVOTHYROXINE SODIUM 25 MCG: 0.03 TABLET ORAL at 05:19

## 2023-03-22 NOTE — PLAN OF CARE
Goal Outcome Evaluation:  Plan of Care Reviewed With: patient        Progress: no change  Outcome Evaluation: Patient A&Ox4.  Report of LUQ pain which she states has improved greatly since the 1 x dose of morphine given in ED.  VSS.  WCTM for the rest of the shift.

## 2023-03-22 NOTE — NURSING NOTE
Pt had a 2.3 (3/21 @ 11:51:34) and a 3.1 (3/22 @ 12:12:44) second pause.  EKG showed Atrial Fibrillation.  Left axis deviation. Abnormal R-wave progression, late transition.  Nonspecific T abnormalities.  Troponin ordered awaiting results.

## 2023-03-22 NOTE — CASE MANAGEMENT/SOCIAL WORK
Discharge Planning Assessment  [facility]     Patient Name: Martina Lara  MRN: [id number]  Today's Date: 3/22/2023    Admit Date: 3/21/2023    Plan: Pending Hospital course, HH vs SNF   Discharge Needs Assessment     Row Name 03/22/23 1609       Living Environment    People in Home alone    Current Living Arrangements apartment    Potentially Unsafe Housing Conditions none    Primary Care Provided by self    Provides Primary Care For no one, unable/limited ability to care for self    Family Caregiver if Needed friend(s);other relative(s)    Quality of Family Relationships helpful;involved;supportive    Able to Return to Prior Arrangements yes    Living Arrangement Comments 2nd floor apartment, no elevator access       Resource/Environmental Concerns    Resource/Environmental Concerns home accessibility    Home Accessibility Concerns stairs to enter home    Transportation Concerns none       Transition Planning    Patient/Family Anticipates Transition to home with help/services;inpatient rehabilitation facility    Patient/Family Anticipated Services at Transition rehabilitation services;home health care    Transportation Anticipated health plan transportation;family or friend will provide       Discharge Needs Assessment    Readmission Within the Last 30 Days no previous admission in last 30 days    Equipment Currently Used at Home walker, rolling;bipap;shower chair;cane, quad tip    Concerns to be Addressed discharge planning;adjustment to diagnosis/illness;decision-making    Anticipated Changes Related to Illness other (see comments)    Equipment Needed After Discharge other (see comments)    Discharge Facility/Level of Care Needs home with home health;nursing facility, skilled    Current Discharge Risk chronically ill;lives alone               Discharge Plan     Row Name 03/22/23 1608       Plan    Plan Pending Hospital course, HH vs SNF    Roadmap to Recovery Yes    Patient/Family in Agreement with Plan  yes    Provided Post Acute Provider List? Yes    Post Acute Provider List Nursing Home;Home Health    Provided Post Acute Provider Quality & Resource List? Yes    Post Acute Provider Quality and Resource List Home Health;Nursing Home    Delivered To Support Person;Patient    Method of Delivery In person    Plan Comments CCP spoke with pt and emelina Moran” Liz [phone] at bedside, permission granted to speak with pt. Introduced self and explained CCP role. Verified facesheet and confirmed local pharmacy is [facility]. Pt denies problems with medication costs. PCP is Dr. Slois. Pt denies completed advance directive. Pt wishes her niece to be her decision maker. Offered advance directive and she wishes to have them come by to complete one. Consult placed. Pt lives in a 2nd floor apartment, with No elevator access. Prior to admission pt was IADL with quad cane and able to manage her steps. Pt does not drive, she has friends or [employer] assist her. CCP encouraged pt to ambulate with nursing and sit up in the chair with meals etc. to maintain mobility. Pt has a walker, shower chair and Bipap (from [employer]). Pt has no SNF hx and no recent HH hx (20 years ago). CCP reviewed HH vs SNF services, reviewed [employer] MCR pre-cert process. Pt awaiting determination of possible surgery. CCP explained will follow pending on hospital course and assist with dc planning. CCP contact, HH/SNF list provided copies to both pt and emelina Hsu as she lives in [place]. Jermaine LAIRD/KENNY              Continued Care and Services - Admitted Since 3/21/2023    Coordination has not been started for this encounter.          Demographic Summary    No documentation.                Functional Status     Row Name 03/22/23 5894       Functional Status    Usual Activity Tolerance good    Current Activity Tolerance moderate       Functional Status, IADL    Medications independent    Meal Preparation independent    Housekeeping independent     Laundry independent    Shopping assistive person       Mental Status    General Appearance WDL WDL       Mental Status Summary    Recent Changes in Mental Status/Cognitive Functioning no changes       Employment/    Employment Status retired               Psychosocial    No documentation.                Abuse/Neglect    No documentation.                Legal    No documentation.                Substance Abuse    No documentation.                Patient Forms    No documentation.                   Snow Barton RN

## 2023-03-22 NOTE — H&P
HISTORY AND PHYSICAL   [facility]        Date of Admission: 3/21/2023  Patient Identification:  Name: Martina Lara  Age: 87 y.o.  Sex: female  :  1935  MRN: [id number]                     Primary Care Physician: Sabi Solis MD    Chief Complaint:  87 year old female who presented to the emergency room with abdominal pain which started a few days ago; the pain is mainly on the left side; she denies fever; no nausea or vomiting; she is feeling better after being medicated in the ED; the pain has been constant and intermittently severe; she has had bowel movement and the last was this am;     History of Present Illness:   As above    Past Medical History:  Hypertension  ckd3  Diabetes  ra    Past Surgical History:  No past surgical history on file.   Home Meds:  No medications prior to admission.       Allergies:  No Known Allergies  Immunizations:  Immunization History   Administered Date(s) Administered   • COVID-19 (PFIZER) BIVALENT BOOSTER 12+YRS 2022   • Covid-19 (Pfizer) Gray Cap 2022     Social History:   Social History     Social History Narrative   • Not on file     Social History     Socioeconomic History   • Marital status: Single       Family History:  No family history on file.     Review of Systems  See history of present illness and past medical history.  Patient denies headache, dizziness, syncope, falls, trauma, change in vision, change in hearing, change in taste, changes in weight, changes in appetite, focal weakness, numbness, or paresthesia.  Patient denies chest pain, palpitations, dyspnea, orthopnea, PND, cough, sinus pressure, rhinorrhea, epistaxis, hemoptysis, nausea, vomiting,hematemesis, diarrhea, constipation or hematchezia.  Denies cold or heat intolerance, polydipsia, polyuria, polyphagia. Denies hematuria, pyuria, dysuria, hesitancy, frequency or urgency. Denies consumption of raw and under cooked meats foods or change in water source.  Denies  "fever, chills, sweats, night sweats.  Denies missing any routine medications. Remainder of ROS is negative.    Objective:  T Max 24 hrs: Temp (24hrs), Av.3 °F (36.8 °C), Min:98.1 °F (36.7 °C), Max:98.4 °F (36.9 °C)    Vitals Ranges:   Temp:  [98.1 °F (36.7 °C)-98.4 °F (36.9 °C)] 98.1 °F (36.7 °C)  Heart Rate:  [80-97] 97  Resp:  [18] 18  BP: (146-167)/(85-97) 160/95      Exam:  /95 (BP Location: Right arm, Patient Position: Lying)   Pulse 97   Temp 98.1 °F (36.7 °C) (Oral)   Resp 18   Ht 170.2 cm (67\")   Wt 58.7 kg (129 lb 8 oz)   SpO2 97%   BMI 20.28 kg/m²     General Appearance:    Alert, cooperative, no distress, appears stated age; thin, frail   Head:    Normocephalic, without obvious abnormality, atraumatic   Eyes:    PERRL, conjunctivae/corneas clear, EOM's intact, both eyes   Ears:    Normal external ear canals, both ears   Nose:   Nares normal, septum midline, mucosa normal, no drainage    or sinus tenderness   Throat:   Lips, mucosa, and tongue normal   Neck:   Supple, symmetrical, trachea midline, no adenopathy;     thyroid:  no enlargement/tenderness/nodules; no carotid    bruit or JVD   Back:     Symmetric, no curvature, ROM normal, no CVA tenderness   Lungs:     Clear to auscultation bilaterally, respirations unlabored   Chest Wall:    No tenderness or deformity    Heart:    Regular rate and rhythm, S1 and S2 normal, no murmur, rub   or gallop   Abdomen:     Soft, mildly tender, bowel sounds active all four quadrants,     no masses, no hepatomegaly, no splenomegaly   Extremities:   Extremities normal, atraumatic, no cyanosis or edema   Pulses:   2+ and symmetric all extremities   Skin:   Skin color, texture, turgor normal, no rashes or lesions               .    Data Review:  Labs in chart were reviewed.  WBC   Date Value Ref Range Status   2023 22.20 (H) 3.40 - 10.80 10*3/mm3 Final     Hemoglobin   Date Value Ref Range Status   2023 13.4 12.0 - 15.9 g/dL Final "     Hematocrit   Date Value Ref Range Status   03/21/2023 39.8 34.0 - 46.6 % Final     Platelets   Date Value Ref Range Status   03/21/2023 120 (L) 140 - 450 10*3/mm3 Final     Sodium   Date Value Ref Range Status   03/21/2023 138 136 - 145 mmol/L Final     Potassium   Date Value Ref Range Status   03/21/2023 4.2 3.5 - 5.2 mmol/L Final     Comment:     Slight hemolysis detected by analyzer. Results may be affected.     Chloride   Date Value Ref Range Status   03/21/2023 104 98 - 107 mmol/L Final     CO2   Date Value Ref Range Status   03/21/2023 24.8 22.0 - 29.0 mmol/L Final     BUN   Date Value Ref Range Status   03/21/2023 25 (H) 8 - 23 mg/dL Final     Creatinine   Date Value Ref Range Status   03/21/2023 1.26 (H) 0.57 - 1.00 mg/dL Final     Glucose   Date Value Ref Range Status   03/21/2023 148 (H) 65 - 99 mg/dL Final     Calcium   Date Value Ref Range Status   03/21/2023 9.1 8.6 - 10.5 mg/dL Final     Magnesium   Date Value Ref Range Status   03/21/2023 2.1 1.6 - 2.4 mg/dL Final     AST (SGOT)   Date Value Ref Range Status   03/21/2023 36 (H) 1 - 32 U/L Final     Comment:     Slight hemolysis detected by analyzer. Results may be affected.     ALT (SGPT)   Date Value Ref Range Status   03/21/2023 15 1 - 33 U/L Final     Alkaline Phosphatase   Date Value Ref Range Status   03/21/2023 67 39 - 117 U/L Final                Imaging Results (All)     Procedure Component Value Units Date/Time    CT Abdomen Pelvis With Contrast [[id number]] Collected: 03/21/23 1834     Updated: 03/21/23 1846    Narrative:      CT ABDOMEN PELVIS W CONTRAST-     INDICATIONS: Left lower quadrant pain     TECHNIQUE: Radiation dose reduction techniques were utilized, including  automated exposure control and exposure modulation based on body size.  Enhanced ABDOMEN AND PELVIS CT     COMPARISON: None available     FINDINGS:     Mild nonspecific thickening of the adrenal glands.     The gallbladder is surgically absent.     A 1.1 cm left renal  low density on axial image 52 shows greater density  than expected for simple cyst, could be hyperdense cyst, or potentially  solid lesion, further evaluation with renal MRI or ultrasound can be  obtained, interval follow-up can characterize change.     There appears to be an indeterminate hypoenhancing focus, versus volume  averaging, at the pancreatic body, 1 cm on axial image 34, possibly  neoplasm not excluded, MRCP correlation could be obtained, interval  follow-up could characterize change.     Otherwise unremarkable appearance of the liver, spleen, adrenal glands,  pancreas, kidneys, bladder.     No bowel obstruction. Fairly extensive small bowel diverticulosis is  present with small bowel diverticulitis in the left aspect of the  abdomen, evidence of microperforation with small extraluminal gas in the  left aspect of the abdomen on axial image 66. Colonic diverticula are  seen that do not appear inflamed.     A periumbilical hernia of fat is seen.           Scattered small mesenteric and para-aortic lymph nodes are seen that are  not significant by size criteria.     Abdominal aorta is not aneurysmal. Aortic and other arterial  calcifications are present.     The lung bases are clear.     Degenerative changes are seen in the spine. Chronic appearing L1  compression deformity.             Impression:            1. Critical test result. Small bowel diverticulitis with  microperforation.  2 indeterminate pancreatic and left renal lesions, as described above.     Discussed by telephone with Dr. Douglass at 1841, 03/21/2023.     This report was finalized on 3/21/2023 6:43 PM by Dr. Myke Simon M.D.               Assessment:  Active Hospital Problems    Diagnosis  POA   • **Acute diverticulitis [K57.92]  Yes      Resolved Hospital Problems   No resolved problems to display.   hypertension  Diabetes  Rheumatoid arthritis  A fib  Cad  underweight    Plan:  Conservative management with bowel rest, fluids,  antibiotics  Surgery input reviewed  Trend labs  Sy patient and ED Provider    Kassidy Sagastume MD  3/21/2023  20:54 EDT

## 2023-03-22 NOTE — CONSULTS
Cc: Abdominal pain    History of presenting illness:   This is a nice 87-year-old lady who seems to have some element of at least memory issues presents with abdominal pain over the last couple of days.  She points to pain on the left side.  She denies any fever or decreased appetite.  The pain seems to be a little bit worse with movement.  Her ability to give a good history is in doubt.  Narcotics received here in the emergency department have substantially improved her pain.    Past Medical History: Hypertension, chronic kidney disease, diabetes, atrial fibrillation, rheumatoid arthritis, sleep apnea, breast cancer, DVT, hypothyroidism    Past Surgical History: Remote appendectomy in the 1960s.  She has had a cholecystectomy, timing unclear.  Tonsillectomy.  Hysterectomy with bilateral salpingo oophorectomy.  Colonoscopy apparently most recently around 2015.  Patient reports having had a partial colon resection, but I think that is probably inaccurate and that she had polyps removed at the time of colonoscopy, this is not entirely clear.    Medications: At least as of a couple of months ago included amlodipine, aspirin, Lasix, gabapentin, irbesartan, leflunomide, levothyroxine, Namenda, metoprolol, Protonix, Aleve although, Ventolin inhaler, tizanidine    Allergies: None known    Social History: Non-smoker    Family History: No known family history of colon cancer    Review of Systems:  Constitutional: Patient denies chills, fevers, change in weight  Neck: no swollen glands or dysphagia or odynophagia  Respiratory: negative for SOB, cough, hemoptysis or wheezing  Cardiovascular: negative for chest pain, palpitations or peripheral edema  Gastrointestinal: Positive for abdominal pain, denies nausea or vomiting      Physical Exam:  BMI: 25.9  Temperature 98.4 heart rate 80 blood pressure 146/85  General: alert and oriented although she seems to be intermittently confused, no acute distress chronic ill  appearance  Eyes: No scleral icterus, extraocular movements are intact  Neck: Supple without lymphadenopathy or thyromegaly, trachea is in the midline  Respiratory: There is good bilateral chest expansion, no use of accessory muscles is noted  Cardiovascular: No jugular venous distention or peripheral edema is seen  Gastrointestinal: Soft, there is mild left mid to left lower quadrant tenderness, certainly no guarding or peritonitis    Laboratory data: White blood cell count of 22,000 with 90% neutrophils.  Hemoglobin okay at 13.4 platelets have been chronically low, 120 today.  Chemistries are in reasonable order, creatinine slightly elevated at 1.26 AST mildly elevated at 36, albumin 3.9    Imaging data: CT images are reviewed by me.  There is evidence of small bowel diverticulitis and perhaps a droplet or 2 of air outside the small bowel.  There is inflammatory change involving a loop of small bowel in the left mid abdomen, no fluid seen collected outside this area and no obvious twisting of the mesentery.      Assessment and plan:   -Agree with primary diagnosis of acute small bowel diverticulitis  -No evidence of abscess or perforation at this time and no evidence of peritonitis and as such given her age and preponderance of other medical issues I think that a period of conservative observation with antibiotics and fluid support is reasonable.  At the moment I do not think operative intervention is in her best interest.  Discussed in detail with patient that should she show signs of worsening repeat imaging and/or surgical intervention could be needed on an acute basis.  We will follow along closely with you.      Itz Miranda MD, FACS  General, Minimally Invasive and Endoscopic Surgery  [facility]    [address], [address]  [place], [place], [address]  P: [phone]  F: [phone]

## 2023-03-22 NOTE — PROGRESS NOTES
Name: Martina Lara ADMIT: 3/21/2023   : 1935  PCP: Sabi Solis MD    MRN: [id number] LOS: 0 days   AGE/SEX: 87 y.o. female  ROOM: [facility]     Subjective   Subjective   Feels some improvement since admission.  Still with some abdominal discomfort may be a little worse after clear liquids.  No vomiting or diarrhea.    Review of Systems     Objective   Objective   Vital Signs  Temp:  [97.7 °F (36.5 °C)-98.4 °F (36.9 °C)] 98 °F (36.7 °C)  Heart Rate:  [74-97] 78  Resp:  [18] 18  BP: (112-167)/(76-97) 131/94  Body mass index is 23.93 kg/m².    Physical Exam  Vitals and nursing note reviewed.   Constitutional:       General: She is not in acute distress.  Cardiovascular:      Rate and Rhythm: Normal rate. Rhythm irregularly irregular.   Pulmonary:      Effort: Pulmonary effort is normal.      Breath sounds: Normal breath sounds.   Abdominal:      General: Bowel sounds are normal.      Palpations: Abdomen is soft.      Tenderness: There is abdominal tenderness.   Musculoskeletal:         General: No swelling.   Skin:     General: Skin is warm and dry.   Neurological:      Mental Status: She is alert. Mental status is at baseline.         Results Review       I reviewed the patient's new clinical results.  Results from last 7 days   Lab Units 23  0545 23  1713   WBC 10*3/mm3 14.55* 22.20*   HEMOGLOBIN g/dL 12.2 13.4   PLATELETS 10*3/mm3 89* 120*     Results from last 7 days   Lab Units 23  0545 23  1713   SODIUM mmol/L 140 138   POTASSIUM mmol/L 3.6 4.2   CHLORIDE mmol/L 107 104   CO2 mmol/L 25.0 24.8   BUN mg/dL 22 25*   CREATININE mg/dL 1.22* 1.26*   GLUCOSE mg/dL 119* 148*   EGFR mL/min/1.73 43.3* 41.7*     Results from last 7 days   Lab Units 23  0545 23  1713   CALCIUM mg/dL 8.0* 9.1   ALBUMIN g/dL  --  3.9   MAGNESIUM mg/dL  --  2.1       Hemoglobin A1C   Date/Time Value Ref Range Status   2023 0545 5.60 4.80 - 5.60 % Final     Glucose   Date/Time Value Ref  Range Status   03/22/2023 1035 137 (H) 70 - 130 mg/dL Final     Comment:     Meter: [id number] : [id number] Chiara PURDY   03/21/2023 2047 128 70 - 130 mg/dL Final     Comment:     Meter: [id number] : [id number] Basil PURDY       CT SCAN ABDOMEN AND PELVIS  FINDINGS:      Mild nonspecific thickening of the adrenal glands.      The gallbladder is surgically absent.      A 1.1 cm left renal low density on axial image 52 shows greater density   than expected for simple cyst, could be hyperdense cyst, or potentially   solid lesion, further evaluation with renal MRI or ultrasound can be   obtained, interval follow-up can characterize change.      There appears to be an indeterminate hypoenhancing focus, versus volume   averaging, at the pancreatic body, 1 cm on axial image 34, possibly   neoplasm not excluded, MRCP correlation could be obtained, interval   follow-up could characterize change.      Otherwise unremarkable appearance of the liver, spleen, adrenal glands,   pancreas, kidneys, bladder.      No bowel obstruction. Fairly extensive small bowel diverticulosis is   present with small bowel diverticulitis in the left aspect of the   abdomen, evidence of microperforation with small extraluminal gas in the   left aspect of the abdomen on axial image 66. Colonic diverticula are   seen that do not appear inflamed.      A periumbilical hernia of fat is seen.       Scattered small mesenteric and para-aortic lymph nodes are seen that are   not significant by size criteria.      Abdominal aorta is not aneurysmal. Aortic and other arterial   calcifications are present.      The lung bases are clear.      Degenerative changes are seen in the spine. Chronic appearing L1   compression deformity.          Impression:        1. Critical test result. Small bowel diverticulitis with   microperforation.   2 indeterminate pancreatic and left renal lesions, as described above.         I have personally reviewed  all medications:  Scheduled Medications  amLODIPine, 5 mg, Oral, Q24H  atorvastatin, 20 mg, Oral, Nightly  insulin lispro, 0-9 Units, Subcutaneous, TID With Meals  levothyroxine, 25 mcg, Oral, Q AM  losartan, 50 mg, Oral, Q24H  memantine, 5 mg, Oral, Daily  metoprolol tartrate, 25 mg, Oral, Q12H  pantoprazole, 40 mg, Oral, Q AM  piperacillin-tazobactam, 3.375 g, Intravenous, Q8H    Infusions  sodium chloride, 75 mL/hr, Last Rate: 75 mL/hr (03/22/23 1032)    Diet  Diet: Liquid Diets; Clear Liquid; Texture: Regular Texture (IDDSI 7); Fluid Consistency: Thin (IDDSI 0)    I have personally reviewed:  [x]  Laboratory   [x]  Microbiology   [x]  Radiology   [x]  EKG/Telemetry  [x]  Cardiology/Vascular   []  Pathology    []  Records         Assessment/Plan     Active Hospital Problems    Diagnosis  POA   • **Acute diverticulitis [K57.92]  Yes   • Hypertension [I10]  Yes   • Type 2 diabetes mellitus with stage 3b chronic kidney disease, without long-term current use of insulin (HCC) [E11.22, N18.32]  Yes   • Thrombocytopenia (HCC) [D69.6]  Yes   • Persistent atrial fibrillation (HCC) [I48.19]  Yes   • Rheumatoid arthritis (HCC) [M06.9]  Yes   • CAD (coronary artery disease) [I25.10]  Yes   • Lesion of left kidney [N28.9]  Yes      Resolved Hospital Problems   No resolved problems to display.       87 y.o. female admitted with acute diverticulitis.    · Symptoms improved as of this morning.  · Continue ZOSYN IV to cover GNRs and anaerobes.  · Diet: Liquid Diets; Clear Liquid; Texture: Regular Texture (IDDSI 7); Fluid Consistency: Thin (IDDSI 0) for now.  Will advance to low fiber/low residue diet based on response to treatment.   · General surgery following.  · NS @ 75 cc/hr  · Continue to monitor vital signs and pain  · BS stable, continue SSI.  · She has persistent atrial fibrillation and nursing called 3-second pause to on-call provider.  Troponin ordered slightly elevated but delta is negative.  She has no complaint of  "chest pain.  No plans for further cardiac work-up at present.  · Left kidney has a 1.1 cm cyst versus solid lesion that radiologist is recommending renal protocol MRI or ultrasound.  There was also a indeterminate hypoenhancing \"focus\" could not rule out neoplasm.  Suggested MRCP could further evaluate.  For now will get renal ultrasound and check CA 19-9.  · SCDs for DVT prophylaxis.  · Full code.  · Discussed with patient and care team on multidisciplinary rounds.  · Anticipate discharge home with family later this week.      Kiran Schulte MD  [facility]  03/22/23  12:20 EDT    "

## 2023-03-23 ENCOUNTER — APPOINTMENT (OUTPATIENT)
Dept: CT IMAGING | Facility: HOSPITAL | Age: 88
End: 2023-03-23
Payer: MEDICARE

## 2023-03-23 LAB
ANION GAP SERPL CALCULATED.3IONS-SCNC: 9 MMOL/L (ref 5–15)
BUN SERPL-MCNC: 16 MG/DL (ref 8–23)
BUN/CREAT SERPL: 14.4 (ref 7–25)
CALCIUM SPEC-SCNC: 7.9 MG/DL (ref 8.6–10.5)
CANCER AG19-9 SERPL-ACNC: 67.5 U/ML
CHLORIDE SERPL-SCNC: 109 MMOL/L (ref 98–107)
CO2 SERPL-SCNC: 23 MMOL/L (ref 22–29)
CREAT SERPL-MCNC: 1.11 MG/DL (ref 0.57–1)
DEPRECATED RDW RBC AUTO: 43.7 FL (ref 37–54)
EGFRCR SERPLBLD CKD-EPI 2021: 48.5 ML/MIN/1.73
ERYTHROCYTE [DISTWIDTH] IN BLOOD BY AUTOMATED COUNT: 13.2 % (ref 12.3–15.4)
GLUCOSE BLDC GLUCOMTR-MCNC: 101 MG/DL (ref 70–130)
GLUCOSE BLDC GLUCOMTR-MCNC: 102 MG/DL (ref 70–130)
GLUCOSE BLDC GLUCOMTR-MCNC: 109 MG/DL (ref 70–130)
GLUCOSE BLDC GLUCOMTR-MCNC: 130 MG/DL (ref 70–130)
GLUCOSE SERPL-MCNC: 101 MG/DL (ref 65–99)
HCT VFR BLD AUTO: 35 % (ref 34–46.6)
HGB BLD-MCNC: 11.5 G/DL (ref 12–15.9)
MCH RBC QN AUTO: 29.9 PG (ref 26.6–33)
MCHC RBC AUTO-ENTMCNC: 32.9 G/DL (ref 31.5–35.7)
MCV RBC AUTO: 90.9 FL (ref 79–97)
PLATELET # BLD AUTO: 93 10*3/MM3 (ref 140–450)
PMV BLD AUTO: 10.7 FL (ref 6–12)
POTASSIUM SERPL-SCNC: 3.6 MMOL/L (ref 3.5–5.2)
RBC # BLD AUTO: 3.85 10*6/MM3 (ref 3.77–5.28)
SODIUM SERPL-SCNC: 141 MMOL/L (ref 136–145)
WBC NRBC COR # BLD: 8.94 10*3/MM3 (ref 3.4–10.8)

## 2023-03-23 PROCEDURE — 74177 CT ABD & PELVIS W/CONTRAST: CPT

## 2023-03-23 PROCEDURE — 85027 COMPLETE CBC AUTOMATED: CPT | Performed by: HOSPITALIST

## 2023-03-23 PROCEDURE — 80048 BASIC METABOLIC PNL TOTAL CA: CPT | Performed by: HOSPITALIST

## 2023-03-23 PROCEDURE — 0 DIATRIZOATE MEGLUMINE & SODIUM PER 1 ML: Performed by: HOSPITALIST

## 2023-03-23 PROCEDURE — 25510000001 IOPAMIDOL 61 % SOLUTION: Performed by: HOSPITALIST

## 2023-03-23 PROCEDURE — 96361 HYDRATE IV INFUSION ADD-ON: CPT

## 2023-03-23 PROCEDURE — 25010000002 ONDANSETRON PER 1 MG: Performed by: INTERNAL MEDICINE

## 2023-03-23 PROCEDURE — 82962 GLUCOSE BLOOD TEST: CPT

## 2023-03-23 PROCEDURE — 86301 IMMUNOASSAY TUMOR CA 19-9: CPT | Performed by: HOSPITALIST

## 2023-03-23 PROCEDURE — 99232 SBSQ HOSP IP/OBS MODERATE 35: CPT | Performed by: SURGERY

## 2023-03-23 PROCEDURE — 25010000002 PIPERACILLIN SOD-TAZOBACTAM PER 1 G: Performed by: INTERNAL MEDICINE

## 2023-03-23 PROCEDURE — 96375 TX/PRO/DX INJ NEW DRUG ADDON: CPT

## 2023-03-23 RX ORDER — HYDROCODONE BITARTRATE AND ACETAMINOPHEN 5; 325 MG/1; MG/1
1 TABLET ORAL EVERY 4 HOURS PRN
Status: DISCONTINUED | OUTPATIENT
Start: 2023-03-23 | End: 2023-03-28 | Stop reason: HOSPADM

## 2023-03-23 RX ADMIN — LOSARTAN POTASSIUM 50 MG: 50 TABLET, FILM COATED ORAL at 09:22

## 2023-03-23 RX ADMIN — LEVOTHYROXINE SODIUM 25 MCG: 0.03 TABLET ORAL at 05:14

## 2023-03-23 RX ADMIN — IOPAMIDOL 85 ML: 612 INJECTION, SOLUTION INTRAVENOUS at 15:44

## 2023-03-23 RX ADMIN — ONDANSETRON 4 MG: 2 INJECTION INTRAMUSCULAR; INTRAVENOUS at 17:58

## 2023-03-23 RX ADMIN — TAZOBACTAM SODIUM AND PIPERACILLIN SODIUM 3.38 G: 375; 3 INJECTION, SOLUTION INTRAVENOUS at 00:35

## 2023-03-23 RX ADMIN — TAZOBACTAM SODIUM AND PIPERACILLIN SODIUM 3.38 G: 375; 3 INJECTION, SOLUTION INTRAVENOUS at 09:22

## 2023-03-23 RX ADMIN — MEMANTINE HYDROCHLORIDE 5 MG: 5 TABLET, FILM COATED ORAL at 09:22

## 2023-03-23 RX ADMIN — TAZOBACTAM SODIUM AND PIPERACILLIN SODIUM 3.38 G: 375; 3 INJECTION, SOLUTION INTRAVENOUS at 17:58

## 2023-03-23 RX ADMIN — ACETAMINOPHEN 650 MG: 325 TABLET, FILM COATED ORAL at 00:43

## 2023-03-23 RX ADMIN — PANTOPRAZOLE SODIUM 40 MG: 40 TABLET, DELAYED RELEASE ORAL at 05:14

## 2023-03-23 RX ADMIN — SODIUM CHLORIDE 75 ML/HR: 9 INJECTION, SOLUTION INTRAVENOUS at 18:03

## 2023-03-23 RX ADMIN — ATORVASTATIN CALCIUM 20 MG: 20 TABLET, FILM COATED ORAL at 20:34

## 2023-03-23 RX ADMIN — DIATRIZOATE MEGLUMINE AND DIATRIZOATE SODIUM 30 ML: 660; 100 LIQUID ORAL; RECTAL at 13:52

## 2023-03-23 RX ADMIN — METOPROLOL TARTRATE 25 MG: 25 TABLET, FILM COATED ORAL at 09:22

## 2023-03-23 RX ADMIN — METOPROLOL TARTRATE 25 MG: 25 TABLET, FILM COATED ORAL at 20:34

## 2023-03-23 RX ADMIN — Medication 3 MG: at 20:34

## 2023-03-23 RX ADMIN — AMLODIPINE BESYLATE 5 MG: 5 TABLET ORAL at 09:22

## 2023-03-23 NOTE — PROGRESS NOTES
Name: Martina Lara ADMIT: 3/21/2023   : 1935  PCP: Sabi Solis MD    MRN: [id number] LOS: 1 days   AGE/SEX: 87 y.o. female  ROOM: [facility]     Subjective   Subjective   Still having abdominal discomfort.    Review of Systems     Objective   Objective   Vital Signs  Temp:  [97.9 °F (36.6 °C)-98.3 °F (36.8 °C)] 98.3 °F (36.8 °C)  Heart Rate:  [75-87] 87  Resp:  [18-20] 20  BP: (142-153)/(69-91) 153/86  Body mass index is 23.93 kg/m².    Physical Exam  Vitals and nursing note reviewed.   Constitutional:       General: She is not in acute distress.  Cardiovascular:      Rate and Rhythm: Normal rate. Rhythm irregularly irregular.   Pulmonary:      Effort: Pulmonary effort is normal.      Breath sounds: Normal breath sounds.   Abdominal:      General: Bowel sounds are normal.      Palpations: Abdomen is soft.      Tenderness: There is abdominal tenderness.   Musculoskeletal:         General: No swelling.   Skin:     General: Skin is warm and dry.   Neurological:      Mental Status: She is alert. Mental status is at baseline.         Results Review       I reviewed the patient's new clinical results.  Results from last 7 days   Lab Units 23  0531 23  0545 23  1713   WBC 10*3/mm3 8.94 14.55* 22.20*   HEMOGLOBIN g/dL 11.5* 12.2 13.4   PLATELETS 10*3/mm3 93* 89* 120*     Results from last 7 days   Lab Units 23  0531 23  0545 23  1713   SODIUM mmol/L 141 140 138   POTASSIUM mmol/L 3.6 3.6 4.2   CHLORIDE mmol/L 109* 107 104   CO2 mmol/L 23.0 25.0 24.8   BUN mg/dL 16 22 25*   CREATININE mg/dL 1.11* 1.22* 1.26*   GLUCOSE mg/dL 101* 119* 148*   EGFR mL/min/1.73 48.5* 43.3* 41.7*     Results from last 7 days   Lab Units 23  0531 23  0545 03/21/23  1713   CALCIUM mg/dL 7.9* 8.0* 9.1   ALBUMIN g/dL  --   --  3.9   MAGNESIUM mg/dL  --   --  2.1       Hemoglobin A1C   Date/Time Value Ref Range Status   2023 0545 5.60 4.80 - 5.60 % Final     Glucose   Date/Time  Value Ref Range Status   03/23/2023 1044 109 70 - 130 mg/dL Final     Comment:     Meter: [id number] : [id number] Chiara Kerr NA   03/23/2023 0632 101 70 - 130 mg/dL Final     Comment:     Meter: [id number] : [id number] Harsha Botello NA   03/22/2023 2110 119 70 - 130 mg/dL Final     Comment:     Meter: [id number] : [id number] Harsha Botello NA   03/22/2023 1541 108 70 - 130 mg/dL Final     Comment:     Meter: [id number] : [id number] Chiara Usha NA   03/22/2023 1035 137 (H) 70 - 130 mg/dL Final     Comment:     Meter: [id number] : [id number] Chiara eKrr NA   03/21/2023 2047 128 70 - 130 mg/dL Final     Comment:     Meter: [id number] : [id number] Basil PURDY       CT SCAN ABDOMEN AND PELVIS  FINDINGS:      Mild nonspecific thickening of the adrenal glands.      The gallbladder is surgically absent.      A 1.1 cm left renal low density on axial image 52 shows greater density   than expected for simple cyst, could be hyperdense cyst, or potentially   solid lesion, further evaluation with renal MRI or ultrasound can be   obtained, interval follow-up can characterize change.      There appears to be an indeterminate hypoenhancing focus, versus volume   averaging, at the pancreatic body, 1 cm on axial image 34, possibly   neoplasm not excluded, MRCP correlation could be obtained, interval   follow-up could characterize change.      Otherwise unremarkable appearance of the liver, spleen, adrenal glands,   pancreas, kidneys, bladder.      No bowel obstruction. Fairly extensive small bowel diverticulosis is   present with small bowel diverticulitis in the left aspect of the   abdomen, evidence of microperforation with small extraluminal gas in the   left aspect of the abdomen on axial image 66. Colonic diverticula are   seen that do not appear inflamed.      A periumbilical hernia of fat is seen.       Scattered small mesenteric and para-aortic lymph nodes are seen that are   not  significant by size criteria.      Abdominal aorta is not aneurysmal. Aortic and other arterial   calcifications are present.      The lung bases are clear.      Degenerative changes are seen in the spine. Chronic appearing L1   compression deformity.          Impression:        1. Critical test result. Small bowel diverticulitis with   microperforation.   2 indeterminate pancreatic and left renal lesions, as described above.         I have personally reviewed all medications:  Scheduled Medications  amLODIPine, 5 mg, Oral, Q24H  atorvastatin, 20 mg, Oral, Nightly  insulin lispro, 0-9 Units, Subcutaneous, TID With Meals  levothyroxine, 25 mcg, Oral, Q AM  losartan, 50 mg, Oral, Q24H  memantine, 5 mg, Oral, Daily  metoprolol tartrate, 25 mg, Oral, Q12H  pantoprazole, 40 mg, Oral, Q AM  piperacillin-tazobactam, 3.375 g, Intravenous, Q8H    Infusions  sodium chloride, 75 mL/hr, Last Rate: 75 mL/hr (03/22/23 2237)    Diet  Diet: Liquid Diets; Clear Liquid; Texture: Regular Texture (IDDSI 7); Fluid Consistency: Thin (IDDSI 0)    I have personally reviewed:  [x]  Laboratory   [x]  Microbiology   [x]  Radiology   [x]  EKG/Telemetry  [x]  Cardiology/Vascular   []  Pathology    []  Records         Assessment/Plan     Active Hospital Problems    Diagnosis  POA   • **Acute diverticulitis [K57.92]  Yes   • Hypertension [I10]  Yes   • Type 2 diabetes mellitus with stage 3b chronic kidney disease, without long-term current use of insulin (HCC) [E11.22, N18.32]  Yes   • Thrombocytopenia (HCC) [D69.6]  Yes   • Persistent atrial fibrillation (HCC) [I48.19]  Yes   • Rheumatoid arthritis (HCC) [M06.9]  Yes   • CAD (coronary artery disease) [I25.10]  Yes   • Lesion of left kidney [N28.9]  Yes   • Diverticular disease of intestine with perforation and abscess [K57.80]  Yes      Resolved Hospital Problems   No resolved problems to display.       87 y.o. female admitted with acute diverticulitis.    Symptoms slowly improving.   "Leukocytosis has resolved.  Seen by general surgery today who recommends to repeat abdominal CT.  Continue IV Zosyn.  Defer any adjustments in diet to surgery.  Continue IV fluids.  Norco added for pain.    Left kidney has a 1.1 cm cyst versus solid lesion on CT scan.  Follow-up renal ultrasound unfortunately did not demonstrate this area and they are recommending renal protocol MRI.  There was also a indeterminate hypoenhancing \"focus\" could not rule out neoplasm.  Suggested MRCP could further evaluate.  CA 19-9 slightly elevated.  Would likely get abdominal MRI as outpatient to evaluate both of these abnormalities when she has recovered from acute illness.      · SCDs for DVT prophylaxis.  · Full code.  · Discussed with patient and care team on multidisciplinary rounds.  · Anticipate discharge home with family later this week.      Kiran Schulte MD  [facility]  03/23/23  15:49 EDT    "

## 2023-03-23 NOTE — PLAN OF CARE
Goal Outcome Evaluation:  Plan of Care Reviewed With: patient        Progress: improving  Outcome Evaluation: Pt. A&Ox4. A 3.5 second pause  on 3/22 at 9:29:59 PM with patient not reporting any dizziness, chest pain or discomfort.    Report of 5/10 abdominal pain and tylenol given x 1 during the shift.  VSS.  WCTM for the rest of the shift.

## 2023-03-23 NOTE — PROGRESS NOTES
Chief complaint: Small bowel diverticulitis    Subjective: Says that earlier this morning she felt a bit better but currently is having more crampy pain.  She has had multiple bowel movements.  Does not feel very hungry, still feels fairly unwell.    Review of systems:  Constitutional: Denies fever, chills, change in weight  Respiratory: Denies cough or wheezing    Physical exam:  Afebrile, heart rate 70s to 80s blood pressure 142/91  General: Awake and alert, no acute distress, but looks mildly uncomfortable  Head: Normocephalic, atraumatic  Eyes: Extraocular movements intact, no icterus  Neck: Supple, trachea midline  Respiratory: No use of accessory muscles, good bilateral chest expansion  Gastrointestinal: Soft, left-sided tenderness persists, no guarding and no rebound tenderness, no evidence of peritonitis  Extremities: No peripheral edema, no deformity  Skin: Warm and dry    White blood cell count has normalized at 8.9, hemoglobin 11.5 platelets remain in the same range at 93.  Chemistries largely unremarkable.  CA 19-9 elevated at 67    Assessment and plan:  -Small bowel diverticulitis with probable microperforation, labs are improved but clinically patient still is having pain.  I have recommended repeating her CT with oral contrast.  Certainly does not have peritonitis or other exam findings which would warrant urgent operative intervention, and normalization of her white count is reassuring.  She has also had several bowel movements.  -Continue IV antibiotics for now  -IV narcotics as needed  -Question of 1 cm pancreatic mass on CT, CA 19-9 is elevated, unclear what this means in this setting, but eventually would probably benefit from MRCP.  I sincerely doubt that this patient would be a good candidate for any pancreatic intervention almost regardless given her age, but this will eventually need to be worked up further.      Itz Miranda MD  General and Endoscopic Surgery  [facility]    [address], [address]  [place], [place], [address]  P: [phone]  F: [phone]

## 2023-03-24 LAB
ANION GAP SERPL CALCULATED.3IONS-SCNC: 11.5 MMOL/L (ref 5–15)
BUN SERPL-MCNC: 12 MG/DL (ref 8–23)
BUN/CREAT SERPL: 11 (ref 7–25)
CALCIUM SPEC-SCNC: 8.5 MG/DL (ref 8.6–10.5)
CHLORIDE SERPL-SCNC: 107 MMOL/L (ref 98–107)
CO2 SERPL-SCNC: 23.5 MMOL/L (ref 22–29)
CREAT SERPL-MCNC: 1.09 MG/DL (ref 0.57–1)
DEPRECATED RDW RBC AUTO: 43.5 FL (ref 37–54)
EGFRCR SERPLBLD CKD-EPI 2021: 49.6 ML/MIN/1.73
ERYTHROCYTE [DISTWIDTH] IN BLOOD BY AUTOMATED COUNT: 13 % (ref 12.3–15.4)
GLUCOSE BLDC GLUCOMTR-MCNC: 113 MG/DL (ref 70–130)
GLUCOSE BLDC GLUCOMTR-MCNC: 119 MG/DL (ref 70–130)
GLUCOSE BLDC GLUCOMTR-MCNC: 121 MG/DL (ref 70–130)
GLUCOSE BLDC GLUCOMTR-MCNC: 148 MG/DL (ref 70–130)
GLUCOSE SERPL-MCNC: 105 MG/DL (ref 65–99)
HCT VFR BLD AUTO: 36.8 % (ref 34–46.6)
HGB BLD-MCNC: 12.2 G/DL (ref 12–15.9)
MCH RBC QN AUTO: 30.2 PG (ref 26.6–33)
MCHC RBC AUTO-ENTMCNC: 33.2 G/DL (ref 31.5–35.7)
MCV RBC AUTO: 91.1 FL (ref 79–97)
PLATELET # BLD AUTO: 90 10*3/MM3 (ref 140–450)
PMV BLD AUTO: 10.8 FL (ref 6–12)
POTASSIUM SERPL-SCNC: 3.3 MMOL/L (ref 3.5–5.2)
RBC # BLD AUTO: 4.04 10*6/MM3 (ref 3.77–5.28)
SODIUM SERPL-SCNC: 142 MMOL/L (ref 136–145)
WBC NRBC COR # BLD: 8.92 10*3/MM3 (ref 3.4–10.8)

## 2023-03-24 PROCEDURE — 85027 COMPLETE CBC AUTOMATED: CPT | Performed by: HOSPITALIST

## 2023-03-24 PROCEDURE — G0378 HOSPITAL OBSERVATION PER HR: HCPCS

## 2023-03-24 PROCEDURE — 25010000002 PIPERACILLIN SOD-TAZOBACTAM PER 1 G: Performed by: INTERNAL MEDICINE

## 2023-03-24 PROCEDURE — 96372 THER/PROPH/DIAG INJ SC/IM: CPT

## 2023-03-24 PROCEDURE — 82962 GLUCOSE BLOOD TEST: CPT

## 2023-03-24 PROCEDURE — 25010000002 PIPERACILLIN SOD-TAZOBACTAM PER 1 G: Performed by: HOSPITALIST

## 2023-03-24 PROCEDURE — 99231 SBSQ HOSP IP/OBS SF/LOW 25: CPT | Performed by: SURGERY

## 2023-03-24 PROCEDURE — 80048 BASIC METABOLIC PNL TOTAL CA: CPT | Performed by: HOSPITALIST

## 2023-03-24 PROCEDURE — 25010000002 ENOXAPARIN PER 10 MG: Performed by: HOSPITALIST

## 2023-03-24 RX ORDER — POTASSIUM CHLORIDE 750 MG/1
40 TABLET, FILM COATED, EXTENDED RELEASE ORAL AS NEEDED
Status: DISCONTINUED | OUTPATIENT
Start: 2023-03-24 | End: 2023-03-28 | Stop reason: HOSPADM

## 2023-03-24 RX ORDER — POTASSIUM CHLORIDE 1.5 G/1.77G
40 POWDER, FOR SOLUTION ORAL AS NEEDED
Status: DISCONTINUED | OUTPATIENT
Start: 2023-03-24 | End: 2023-03-28 | Stop reason: HOSPADM

## 2023-03-24 RX ORDER — POTASSIUM CHLORIDE 7.45 MG/ML
10 INJECTION INTRAVENOUS
Status: DISCONTINUED | OUTPATIENT
Start: 2023-03-24 | End: 2023-03-28 | Stop reason: HOSPADM

## 2023-03-24 RX ORDER — ENOXAPARIN SODIUM 100 MG/ML
40 INJECTION SUBCUTANEOUS EVERY 24 HOURS
Status: DISCONTINUED | OUTPATIENT
Start: 2023-03-24 | End: 2023-03-28 | Stop reason: HOSPADM

## 2023-03-24 RX ADMIN — TAZOBACTAM SODIUM AND PIPERACILLIN SODIUM 3.38 G: 375; 3 INJECTION, SOLUTION INTRAVENOUS at 18:21

## 2023-03-24 RX ADMIN — LEVOTHYROXINE SODIUM 25 MCG: 0.03 TABLET ORAL at 05:55

## 2023-03-24 RX ADMIN — LOSARTAN POTASSIUM 50 MG: 50 TABLET, FILM COATED ORAL at 09:02

## 2023-03-24 RX ADMIN — AMLODIPINE BESYLATE 5 MG: 5 TABLET ORAL at 09:02

## 2023-03-24 RX ADMIN — TAZOBACTAM SODIUM AND PIPERACILLIN SODIUM 3.38 G: 375; 3 INJECTION, SOLUTION INTRAVENOUS at 00:43

## 2023-03-24 RX ADMIN — TAZOBACTAM SODIUM AND PIPERACILLIN SODIUM 3.38 G: 375; 3 INJECTION, SOLUTION INTRAVENOUS at 09:01

## 2023-03-24 RX ADMIN — ATORVASTATIN CALCIUM 20 MG: 20 TABLET, FILM COATED ORAL at 20:36

## 2023-03-24 RX ADMIN — ENOXAPARIN SODIUM 40 MG: 100 INJECTION SUBCUTANEOUS at 12:16

## 2023-03-24 RX ADMIN — METOPROLOL TARTRATE 25 MG: 25 TABLET, FILM COATED ORAL at 09:01

## 2023-03-24 RX ADMIN — METOPROLOL TARTRATE 25 MG: 25 TABLET, FILM COATED ORAL at 20:36

## 2023-03-24 RX ADMIN — PANTOPRAZOLE SODIUM 40 MG: 40 TABLET, DELAYED RELEASE ORAL at 05:55

## 2023-03-24 RX ADMIN — MEMANTINE HYDROCHLORIDE 5 MG: 5 TABLET, FILM COATED ORAL at 09:02

## 2023-03-24 NOTE — PROGRESS NOTES
Follow-up acute small bowel diverticulitis    Subjective:  Overall reports feeling a little bit better today.  Still some tenderness.  Multiple bowel movements yesterday.  Appetite seems to be increased today.    Objective:  Afebrile, heart rate 70s 80s blood pressure 159/92  General: Awake and alert, no acute distress  Abdomen: Soft, there is still some mild tenderness on the left side, no real guarding    Labs reviewed, white count normal today at 8.9 platelets 90 hemoglobin 12.2 chemistries are in good order    Contrasted CT from yesterday reviewed.  There is certainly no contrast extravasation.  There is still some inflammation although it overall appears improved.  There is a small amount of what looks like fairly benign fluid between some loops of bowel and there is still a droplet or 2 of air.    Assessment and plan:  -Small bowel diverticulitis with probable microperforation  -Certainly not worse than at admission but she is still somewhat tender  -Discussed the options with her.  Its not entirely clear to me she is going to get better with conservative management alone, but she does seem to be at least a little bit better, white count is normalized and she is tolerating some diet.  She is quite anxious to avoid operative intervention and at the moment I do not think there is any distinct indication and as such I think continuing conservative measures for now is reasonable.  She understands that things could progress and she could require exploration and small bowel resection, but for the time being we will allow full liquids today.  Continue antibiotics for now.  We will continue to follow.    Itz Miranda MD  General and Endoscopic Surgery  [facility]    [address], [address]  [place], [place], [address]  P: [phone]  F: [phone]

## 2023-03-24 NOTE — PROGRESS NOTES
Name: Martina Lara ADMIT: 3/21/2023   : 1935  PCP: Sabi Solis MD    MRN: [id number] LOS: 2 days   AGE/SEX: 87 y.o. female  ROOM: [facility]     Subjective   Subjective   Maybe feels a little better today.  Has some increased abdominal pain after breakfast.    Review of Systems     Objective   Objective   Vital Signs  Temp:  [98 °F (36.7 °C)-98.3 °F (36.8 °C)] 98 °F (36.7 °C)  Heart Rate:  [67-91] 67  Resp:  [18-20] 18  BP: (153-157)/(86-92) 155/92  Body mass index is 23.93 kg/m².    Physical Exam  Vitals and nursing note reviewed.   Constitutional:       General: She is not in acute distress.  Cardiovascular:      Rate and Rhythm: Normal rate. Rhythm irregularly irregular.   Pulmonary:      Effort: Pulmonary effort is normal.      Breath sounds: Normal breath sounds.   Abdominal:      General: Bowel sounds are normal.      Palpations: Abdomen is soft.      Tenderness: There is abdominal tenderness.   Musculoskeletal:         General: No swelling.   Skin:     General: Skin is warm and dry.   Neurological:      Mental Status: She is alert. Mental status is at baseline.         Results Review       I reviewed the patient's new clinical results.  Results from last 7 days   Lab Units 23  0458 23  0531 23  0545 23  1713   WBC 10*3/mm3 8.92 8.94 14.55* 22.20*   HEMOGLOBIN g/dL 12.2 11.5* 12.2 13.4   PLATELETS 10*3/mm3 90* 93* 89* 120*     Results from last 7 days   Lab Units 23  0458 23  0531 23  0545 23  1713   SODIUM mmol/L 142 141 140 138   POTASSIUM mmol/L 3.3* 3.6 3.6 4.2   CHLORIDE mmol/L 107 109* 107 104   CO2 mmol/L 23.5 23.0 25.0 24.8   BUN mg/dL 12 16 22 25*   CREATININE mg/dL 1.09* 1.11* 1.22* 1.26*   GLUCOSE mg/dL 105* 101* 119* 148*   EGFR mL/min/1.73 49.6* 48.5* 43.3* 41.7*     Results from last 7 days   Lab Units 23  0458 23  0531 23  0545 23  1713   CALCIUM mg/dL 8.5* 7.9* 8.0* 9.1   ALBUMIN g/dL  --   --   --  3.9    MAGNESIUM mg/dL  --   --   --  2.1       Hemoglobin A1C   Date/Time Value Ref Range Status   03/22/2023 0545 5.60 4.80 - 5.60 % Final     Glucose   Date/Time Value Ref Range Status   03/24/2023 0538 113 70 - 130 mg/dL Final     Comment:     Meter: [id number] : [id number] Harsha Lewisolyn NA   03/23/2023 2122 130 70 - 130 mg/dL Final     Comment:     Meter: [id number] : [id number] Harsha BuchananRosmery NA   03/23/2023 1624 102 70 - 130 mg/dL Final     Comment:     Meter: [id number] : [id number] Chiara Usha NA   03/23/2023 1044 109 70 - 130 mg/dL Final     Comment:     Meter: [id number] : [id number] Guadarrama Usha NA   03/23/2023 0632 101 70 - 130 mg/dL Final     Comment:     Meter: [id number] : [id number] Harsha Lewisolyn NA   03/22/2023 2110 119 70 - 130 mg/dL Final     Comment:     Meter: [id number] : [id number] Harsha BuchananRosmery NA   03/22/2023 1541 108 70 - 130 mg/dL Final     Comment:     Meter: [id number] : [id number] Guadarrama Usha NA       CT SCAN ABDOMEN AND PELVIS  FINDINGS:      Mild nonspecific thickening of the adrenal glands.      The gallbladder is surgically absent.      A 1.1 cm left renal low density on axial image 52 shows greater density   than expected for simple cyst, could be hyperdense cyst, or potentially   solid lesion, further evaluation with renal MRI or ultrasound can be   obtained, interval follow-up can characterize change.      There appears to be an indeterminate hypoenhancing focus, versus volume   averaging, at the pancreatic body, 1 cm on axial image 34, possibly   neoplasm not excluded, MRCP correlation could be obtained, interval   follow-up could characterize change.      Otherwise unremarkable appearance of the liver, spleen, adrenal glands,   pancreas, kidneys, bladder.      No bowel obstruction. Fairly extensive small bowel diverticulosis is   present with small bowel diverticulitis in the left aspect of the   abdomen, evidence of microperforation with small  extraluminal gas in the   left aspect of the abdomen on axial image 66. Colonic diverticula are   seen that do not appear inflamed.      A periumbilical hernia of fat is seen.       Scattered small mesenteric and para-aortic lymph nodes are seen that are   not significant by size criteria.      Abdominal aorta is not aneurysmal. Aortic and other arterial   calcifications are present.      The lung bases are clear.      Degenerative changes are seen in the spine. Chronic appearing L1   compression deformity.          Impression:        1. Critical test result. Small bowel diverticulitis with   microperforation.   2 indeterminate pancreatic and left renal lesions, as described above.         I have personally reviewed all medications:  Scheduled Medications  amLODIPine, 5 mg, Oral, Q24H  atorvastatin, 20 mg, Oral, Nightly  insulin lispro, 0-9 Units, Subcutaneous, TID With Meals  levothyroxine, 25 mcg, Oral, Q AM  losartan, 50 mg, Oral, Q24H  memantine, 5 mg, Oral, Daily  metoprolol tartrate, 25 mg, Oral, Q12H  pantoprazole, 40 mg, Oral, Q AM  piperacillin-tazobactam, 3.375 g, Intravenous, Q8H    Infusions  sodium chloride, 75 mL/hr, Last Rate: 75 mL/hr (03/23/23 1803)    Diet  Diet: Liquid Diets; Clear Liquid; Texture: Regular Texture (IDDSI 7); Fluid Consistency: Thin (IDDSI 0)    I have personally reviewed:  [x]  Laboratory   [x]  Microbiology   [x]  Radiology   [x]  EKG/Telemetry  [x]  Cardiology/Vascular   []  Pathology    []  Records         Assessment/Plan     Active Hospital Problems    Diagnosis  POA   • **Acute diverticulitis [K57.92]  Yes   • Hypertension [I10]  Yes   • Type 2 diabetes mellitus with stage 3b chronic kidney disease, without long-term current use of insulin (HCC) [E11.22, N18.32]  Yes   • Thrombocytopenia (HCC) [D69.6]  Yes   • Persistent atrial fibrillation (HCC) [I48.19]  Yes   • Rheumatoid arthritis (HCC) [M06.9]  Yes   • CAD (coronary artery disease) [I25.10]  Yes   • Lesion of left kidney  "[N28.9]  Yes   • Diverticular disease of intestine with perforation and abscess [K57.80]  Yes      Resolved Hospital Problems   No resolved problems to display.       87 y.o. female admitted with acute diverticulitis.    CT scan yesterday did show some improvement in diverticulitis.  General surgery following and will defer plans to them.  Discussed with nurse yesterday regarding some confusion.  Likely just hospital delirium related to her age.  She seems appropriate and as she has been to me today.  Clinically she is stable.  Await surgery follow-up recommendations.    Left kidney has a 1.1 cm cyst versus solid lesion on CT scan.  Follow-up renal ultrasound unfortunately did not demonstrate this area and they are recommending renal protocol MRI.  There was also a indeterminate hypoenhancing \"focus\" could not rule out neoplasm.  Suggested MRCP could further evaluate.  CA 19-9 slightly elevated.  Would likely get abdominal MRI as outpatient to evaluate both of these abnormalities when she has recovered from acute illness.      · SCDs for DVT prophylaxis.  · Full code.  · Discussed with patient and care team on multidisciplinary rounds.  · Anticipate discharge home with family later this week.      Kiran Schulte MD  [facility]  03/24/23  07:15 EDT    "

## 2023-03-24 NOTE — PROGRESS NOTES
"[facility] Clinical Pharmacy Services: Enoxaparin Consult    Martina Lara has a pharmacy consult to dose prophylactic enoxaparin per Kiran Schulte MD's request.     Indication: VTE Prophylaxis  Home Anticoagulation: none     Relevant clinical data and objective history reviewed:  87 y.o. female 170.2 cm (67\") 69.3 kg (152 lb 12.8 oz)   Body mass index is 23.93 kg/m².   Results from last 7 days   Lab Units 03/24/23  0458   PLATELETS 10*3/mm3 90*     Estimated Creatinine Clearance: 40.5 mL/min (A) (by C-G formula based on SCr of 1.09 mg/dL (H)).    Assessment/Plan    Will start patient on 40mg subcutaneous every 24 hours, adjusted for renal function. Consult order will be discontinued but pharmacy will continue to follow.     Bandar Fontana, PharmD  Clinical Pharmacist    "

## 2023-03-24 NOTE — SIGNIFICANT NOTE
03/24/23 1108   OTHER   Discipline physical therapist   Therapy Assessment/Plan (PT)   Criteria for Skilled Interventions Met (PT) no problems identified which require skilled intervention  (Nursing documented pt up sba and an ampac of 24.  No indication for acute PT.  Continue ambulating in halls with nursing supervision.)

## 2023-03-24 NOTE — PLAN OF CARE
Goal Outcome Evaluation:  Plan of Care Reviewed With: patient        Progress: improving  Outcome Evaluation: PT A&Ox4 with periods of confusion.  Oxygen dropped into the mid to low 80's and 2L of oxygen placed on pt.  Appeared to sleep well throughout the night.  VSS.  WCTM for the rest of the shift.

## 2023-03-25 LAB
ANION GAP SERPL CALCULATED.3IONS-SCNC: 7.8 MMOL/L (ref 5–15)
BUN SERPL-MCNC: 9 MG/DL (ref 8–23)
BUN/CREAT SERPL: 7.3 (ref 7–25)
CALCIUM SPEC-SCNC: 8.4 MG/DL (ref 8.6–10.5)
CHLORIDE SERPL-SCNC: 109 MMOL/L (ref 98–107)
CO2 SERPL-SCNC: 24.2 MMOL/L (ref 22–29)
CREAT SERPL-MCNC: 1.24 MG/DL (ref 0.57–1)
DEPRECATED RDW RBC AUTO: 45.7 FL (ref 37–54)
EGFRCR SERPLBLD CKD-EPI 2021: 42.5 ML/MIN/1.73
ERYTHROCYTE [DISTWIDTH] IN BLOOD BY AUTOMATED COUNT: 13.2 % (ref 12.3–15.4)
GLUCOSE BLDC GLUCOMTR-MCNC: 108 MG/DL (ref 70–130)
GLUCOSE BLDC GLUCOMTR-MCNC: 135 MG/DL (ref 70–130)
GLUCOSE BLDC GLUCOMTR-MCNC: 167 MG/DL (ref 70–130)
GLUCOSE BLDC GLUCOMTR-MCNC: 176 MG/DL (ref 70–130)
GLUCOSE SERPL-MCNC: 119 MG/DL (ref 65–99)
HCT VFR BLD AUTO: 35.5 % (ref 34–46.6)
HGB BLD-MCNC: 11.9 G/DL (ref 12–15.9)
MCH RBC QN AUTO: 31.1 PG (ref 26.6–33)
MCHC RBC AUTO-ENTMCNC: 33.5 G/DL (ref 31.5–35.7)
MCV RBC AUTO: 92.7 FL (ref 79–97)
PLATELET # BLD AUTO: 80 10*3/MM3 (ref 140–450)
PMV BLD AUTO: 10.5 FL (ref 6–12)
POTASSIUM SERPL-SCNC: 3.2 MMOL/L (ref 3.5–5.2)
RBC # BLD AUTO: 3.83 10*6/MM3 (ref 3.77–5.28)
SODIUM SERPL-SCNC: 141 MMOL/L (ref 136–145)
WBC NRBC COR # BLD: 8.72 10*3/MM3 (ref 3.4–10.8)

## 2023-03-25 PROCEDURE — 25010000002 ENOXAPARIN PER 10 MG: Performed by: HOSPITALIST

## 2023-03-25 PROCEDURE — 25010000002 PIPERACILLIN SOD-TAZOBACTAM PER 1 G: Performed by: HOSPITALIST

## 2023-03-25 PROCEDURE — 96366 THER/PROPH/DIAG IV INF ADDON: CPT

## 2023-03-25 PROCEDURE — 80048 BASIC METABOLIC PNL TOTAL CA: CPT | Performed by: HOSPITALIST

## 2023-03-25 PROCEDURE — 82962 GLUCOSE BLOOD TEST: CPT

## 2023-03-25 PROCEDURE — 96372 THER/PROPH/DIAG INJ SC/IM: CPT

## 2023-03-25 PROCEDURE — G0378 HOSPITAL OBSERVATION PER HR: HCPCS

## 2023-03-25 PROCEDURE — 85027 COMPLETE CBC AUTOMATED: CPT | Performed by: HOSPITALIST

## 2023-03-25 PROCEDURE — 99232 SBSQ HOSP IP/OBS MODERATE 35: CPT | Performed by: STUDENT IN AN ORGANIZED HEALTH CARE EDUCATION/TRAINING PROGRAM

## 2023-03-25 RX ADMIN — ATORVASTATIN CALCIUM 20 MG: 20 TABLET, FILM COATED ORAL at 20:24

## 2023-03-25 RX ADMIN — AMLODIPINE BESYLATE 5 MG: 5 TABLET ORAL at 09:18

## 2023-03-25 RX ADMIN — ENOXAPARIN SODIUM 40 MG: 100 INJECTION SUBCUTANEOUS at 11:57

## 2023-03-25 RX ADMIN — POTASSIUM CHLORIDE 40 MEQ: 750 TABLET, EXTENDED RELEASE ORAL at 19:08

## 2023-03-25 RX ADMIN — METOPROLOL TARTRATE 25 MG: 25 TABLET, FILM COATED ORAL at 20:24

## 2023-03-25 RX ADMIN — POTASSIUM CHLORIDE 40 MEQ: 750 TABLET, EXTENDED RELEASE ORAL at 11:58

## 2023-03-25 RX ADMIN — TAZOBACTAM SODIUM AND PIPERACILLIN SODIUM 3.38 G: 375; 3 INJECTION, SOLUTION INTRAVENOUS at 17:46

## 2023-03-25 RX ADMIN — LOSARTAN POTASSIUM 50 MG: 50 TABLET, FILM COATED ORAL at 09:19

## 2023-03-25 RX ADMIN — METOPROLOL TARTRATE 25 MG: 25 TABLET, FILM COATED ORAL at 09:18

## 2023-03-25 RX ADMIN — TAZOBACTAM SODIUM AND PIPERACILLIN SODIUM 3.38 G: 375; 3 INJECTION, SOLUTION INTRAVENOUS at 00:06

## 2023-03-25 RX ADMIN — TAZOBACTAM SODIUM AND PIPERACILLIN SODIUM 3.38 G: 375; 3 INJECTION, SOLUTION INTRAVENOUS at 09:19

## 2023-03-25 RX ADMIN — LEVOTHYROXINE SODIUM 25 MCG: 0.03 TABLET ORAL at 05:19

## 2023-03-25 RX ADMIN — PANTOPRAZOLE SODIUM 40 MG: 40 TABLET, DELAYED RELEASE ORAL at 05:19

## 2023-03-25 RX ADMIN — MEMANTINE HYDROCHLORIDE 5 MG: 5 TABLET, FILM COATED ORAL at 09:18

## 2023-03-25 NOTE — NURSING NOTE
Doing better today. Abdominal pain present but better. Refusing pain medication. Zosyn continues. AOx4. Ad vera in room. VSS.

## 2023-03-25 NOTE — PROGRESS NOTES
Name: Martina Lara ADMIT: 3/21/2023   : 1935  PCP: Sabi Solis MD    MRN: [id number] LOS: 2 days   AGE/SEX: 87 y.o. female  ROOM: [facility]     Subjective   Subjective   Symptoms about the same.  Tolerating full liquid diet.    Review of Systems     Objective   Objective   Vital Signs  Temp:  [97.7 °F (36.5 °C)-98.7 °F (37.1 °C)] 97.7 °F (36.5 °C)  Heart Rate:  [66-85] 79  Resp:  [18] 18  BP: (128-176)/(62-85) 172/85  Body mass index is 23.93 kg/m².    Physical Exam  Vitals and nursing note reviewed.   Constitutional:       General: She is not in acute distress.  Cardiovascular:      Rate and Rhythm: Normal rate. Rhythm irregularly irregular.   Pulmonary:      Effort: Pulmonary effort is normal.      Breath sounds: Normal breath sounds.   Abdominal:      General: Bowel sounds are normal.      Palpations: Abdomen is soft.      Tenderness: There is abdominal tenderness.   Musculoskeletal:         General: No swelling.   Skin:     General: Skin is warm and dry.   Neurological:      Mental Status: She is alert. Mental status is at baseline.         Results Review       I reviewed the patient's new clinical results.  Results from last 7 days   Lab Units 23  04223  0531 23  0545   WBC 10*3/mm3 8.72 8.92 8.94 14.55*   HEMOGLOBIN g/dL 11.9* 12.2 11.5* 12.2   PLATELETS 10*3/mm3 80* 90* 93* 89*     Results from last 7 days   Lab Units 23  0426 23  0458 23  0531 23  0545   SODIUM mmol/L 141 142 141 140   POTASSIUM mmol/L 3.2* 3.3* 3.6 3.6   CHLORIDE mmol/L 109* 107 109* 107   CO2 mmol/L 24.2 23.5 23.0 25.0   BUN mg/dL 9 12 16 22   CREATININE mg/dL 1.24* 1.09* 1.11* 1.22*   GLUCOSE mg/dL 119* 105* 101* 119*   EGFR mL/min/1.73 42.5* 49.6* 48.5* 43.3*     Results from last 7 days   Lab Units 23  0426 23  0458 23  0531 23  0545 23  1713   CALCIUM mg/dL 8.4* 8.5* 7.9* 8.0* 9.1   ALBUMIN g/dL  --   --   --   --  3.9   MAGNESIUM  mg/dL  --   --   --   --  2.1       Glucose   Date/Time Value Ref Range Status   03/25/2023 0610 108 70 - 130 mg/dL Final     Comment:     Meter: [id number] : [id number] Delonte Jimenez NA   03/24/2023 2036 119 70 - 130 mg/dL Final     Comment:     Meter: [id number] : [id number] Maren Manzanares NA   03/24/2023 1552 148 (H) 70 - 130 mg/dL Final     Comment:     Meter: [id number] : [id number] Smoot Marouwan NA   03/24/2023 1124 121 70 - 130 mg/dL Final     Comment:     Meter: [id number] : [id number] Smoot Marouwan NA   03/24/2023 0538 113 70 - 130 mg/dL Final     Comment:     Meter: [id number] : [id number] Harsha Rosmery NA   03/23/2023 2122 130 70 - 130 mg/dL Final     Comment:     Meter: [id number] : [id number] Mcleod Rosmery NA   03/23/2023 1624 102 70 - 130 mg/dL Final     Comment:     Meter: [id number] : [id number] Chiara Kerr NA       CT SCAN ABDOMEN AND PELVIS  FINDINGS:      Mild nonspecific thickening of the adrenal glands.      The gallbladder is surgically absent.      A 1.1 cm left renal low density on axial image 52 shows greater density   than expected for simple cyst, could be hyperdense cyst, or potentially   solid lesion, further evaluation with renal MRI or ultrasound can be   obtained, interval follow-up can characterize change.      There appears to be an indeterminate hypoenhancing focus, versus volume   averaging, at the pancreatic body, 1 cm on axial image 34, possibly   neoplasm not excluded, MRCP correlation could be obtained, interval   follow-up could characterize change.      Otherwise unremarkable appearance of the liver, spleen, adrenal glands,   pancreas, kidneys, bladder.      No bowel obstruction. Fairly extensive small bowel diverticulosis is   present with small bowel diverticulitis in the left aspect of the   abdomen, evidence of microperforation with small extraluminal gas in the   left aspect of the abdomen on axial image 66. Colonic diverticula are    seen that do not appear inflamed.      A periumbilical hernia of fat is seen.       Scattered small mesenteric and para-aortic lymph nodes are seen that are   not significant by size criteria.      Abdominal aorta is not aneurysmal. Aortic and other arterial   calcifications are present.      The lung bases are clear.      Degenerative changes are seen in the spine. Chronic appearing L1   compression deformity.          Impression:        1. Critical test result. Small bowel diverticulitis with   microperforation.   2 indeterminate pancreatic and left renal lesions, as described above.         I have personally reviewed all medications:  Scheduled Medications  amLODIPine, 5 mg, Oral, Q24H  atorvastatin, 20 mg, Oral, Nightly  enoxaparin, 40 mg, Subcutaneous, Q24H  insulin lispro, 0-9 Units, Subcutaneous, TID With Meals  levothyroxine, 25 mcg, Oral, Q AM  losartan, 50 mg, Oral, Q24H  memantine, 5 mg, Oral, Daily  metoprolol tartrate, 25 mg, Oral, Q12H  pantoprazole, 40 mg, Oral, Q AM  piperacillin-tazobactam, 3.375 g, Intravenous, Q8H    Infusions   Diet  Diet: Liquid Diets; Full Liquid; Texture: Regular Texture (IDDSI 7); Fluid Consistency: Thin (IDDSI 0)    I have personally reviewed:  [x]  Laboratory   []  Microbiology   [x]  Radiology   []  EKG/Telemetry  []  Cardiology/Vascular        Assessment/Plan     Active Hospital Problems    Diagnosis  POA   • **Acute diverticulitis [K57.92]  Yes   • Hypertension [I10]  Yes   • Type 2 diabetes mellitus with stage 3b chronic kidney disease, without long-term current use of insulin (HCC) [E11.22, N18.32]  Yes   • Thrombocytopenia (HCC) [D69.6]  Yes   • Persistent atrial fibrillation (HCC) [I48.19]  Yes   • Rheumatoid arthritis (HCC) [M06.9]  Yes   • CAD (coronary artery disease) [I25.10]  Yes   • Lesion of left kidney [N28.9]  Yes   • Diverticular disease of intestine with perforation and abscess [K57.80]  Yes      Resolved Hospital Problems   No resolved problems to  "display.       87 y.o. female admitted with acute diverticulitis.    Continue IV Zosyn.  General surgery following.  Continue full liquid diet.    Left kidney has a 1.1 cm cyst versus solid lesion on CT scan.  Follow-up renal ultrasound unfortunately did not demonstrate this area and they are recommending renal protocol MRI.  There was also a indeterminate hypoenhancing \"focus\" could not rule out neoplasm.  Suggested MRCP could further evaluate.  CA 19-9 slightly elevated.  Would likely get abdominal MRI as outpatient to evaluate both of these abnormalities when she has recovered from acute illness.      · SCDs for DVT prophylaxis.  · Full code.  · Discussed with patient and nursing staff.  · Anticipate discharge home with family later this week.      Kiran Schulte MD  [facility]  03/25/23  09:20 EDT    "

## 2023-03-25 NOTE — PROGRESS NOTES
Follow-up acute small bowel diverticulitis    Subjective:  No new events overnight.  Her pain is controlled.  She feels still feels a little discomfort.  She just got her first full liquid diet tray and ate cream of wheat this am. She did have several loose BM's yesterday.    Objective:  Afebrile, vitals all within normal limits  General: Awake and alert, no acute distress  Abdomen: Soft, there is still some mild tenderness on the left side, nondistended, no peritoneal signs, not guarding    White blood cell count 8 hemoglobin 11 platelets 80 creatinine 1.24    Contrasted CT from yesterday reviewed.  There is certainly no contrast extravasation.  There is still some inflammation although it overall appears improved.  There is a small amount of what looks like fairly benign fluid between some loops of bowel and there is still a droplet or 2 of air.    Assessment and plan:  -Small bowel diverticulitis with probable microperforation, no indication for urgent surgical intervention, continue conservative management.  Full liquid diet as tolerated  On Zosyn, afebrile, white blood cell count normal  Slight increase in her creatinine today, continue to monitor  Lovenox for DVT prophylaxis  Continue to monitor    Mellisa Tejeda MD  General and Endoscopic Surgery  [facility]    [address], [address]  [place], [place], [address]  P: [phone]  F: [phone]

## 2023-03-26 LAB
ANION GAP SERPL CALCULATED.3IONS-SCNC: 11 MMOL/L (ref 5–15)
BACTERIA SPEC AEROBE CULT: NORMAL
BACTERIA SPEC AEROBE CULT: NORMAL
BUN SERPL-MCNC: 10 MG/DL (ref 8–23)
BUN/CREAT SERPL: 8.7 (ref 7–25)
CALCIUM SPEC-SCNC: 8.3 MG/DL (ref 8.6–10.5)
CHLORIDE SERPL-SCNC: 110 MMOL/L (ref 98–107)
CO2 SERPL-SCNC: 23 MMOL/L (ref 22–29)
CREAT SERPL-MCNC: 1.15 MG/DL (ref 0.57–1)
DEPRECATED RDW RBC AUTO: 44.2 FL (ref 37–54)
EGFRCR SERPLBLD CKD-EPI 2021: 46.5 ML/MIN/1.73
ERYTHROCYTE [DISTWIDTH] IN BLOOD BY AUTOMATED COUNT: 13.3 % (ref 12.3–15.4)
GLUCOSE BLDC GLUCOMTR-MCNC: 101 MG/DL (ref 70–130)
GLUCOSE BLDC GLUCOMTR-MCNC: 115 MG/DL (ref 70–130)
GLUCOSE BLDC GLUCOMTR-MCNC: 119 MG/DL (ref 70–130)
GLUCOSE BLDC GLUCOMTR-MCNC: 163 MG/DL (ref 70–130)
GLUCOSE SERPL-MCNC: 114 MG/DL (ref 65–99)
HCT VFR BLD AUTO: 35.2 % (ref 34–46.6)
HGB BLD-MCNC: 12.1 G/DL (ref 12–15.9)
MCH RBC QN AUTO: 31 PG (ref 26.6–33)
MCHC RBC AUTO-ENTMCNC: 34.4 G/DL (ref 31.5–35.7)
MCV RBC AUTO: 90.3 FL (ref 79–97)
PLATELET # BLD AUTO: 88 10*3/MM3 (ref 140–450)
PMV BLD AUTO: 10.7 FL (ref 6–12)
POTASSIUM SERPL-SCNC: 3.9 MMOL/L (ref 3.5–5.2)
RBC # BLD AUTO: 3.9 10*6/MM3 (ref 3.77–5.28)
SODIUM SERPL-SCNC: 144 MMOL/L (ref 136–145)
WBC NRBC COR # BLD: 8.2 10*3/MM3 (ref 3.4–10.8)

## 2023-03-26 PROCEDURE — G0378 HOSPITAL OBSERVATION PER HR: HCPCS

## 2023-03-26 PROCEDURE — 25010000002 PIPERACILLIN SOD-TAZOBACTAM PER 1 G: Performed by: HOSPITALIST

## 2023-03-26 PROCEDURE — 80048 BASIC METABOLIC PNL TOTAL CA: CPT | Performed by: HOSPITALIST

## 2023-03-26 PROCEDURE — 25010000002 ENOXAPARIN PER 10 MG: Performed by: HOSPITALIST

## 2023-03-26 PROCEDURE — 82962 GLUCOSE BLOOD TEST: CPT

## 2023-03-26 PROCEDURE — 63710000001 INSULIN LISPRO (HUMAN) PER 5 UNITS: Performed by: INTERNAL MEDICINE

## 2023-03-26 PROCEDURE — 99232 SBSQ HOSP IP/OBS MODERATE 35: CPT | Performed by: STUDENT IN AN ORGANIZED HEALTH CARE EDUCATION/TRAINING PROGRAM

## 2023-03-26 PROCEDURE — 85027 COMPLETE CBC AUTOMATED: CPT | Performed by: HOSPITALIST

## 2023-03-26 PROCEDURE — 96372 THER/PROPH/DIAG INJ SC/IM: CPT

## 2023-03-26 RX ADMIN — INSULIN LISPRO 2 UNITS: 100 INJECTION, SOLUTION INTRAVENOUS; SUBCUTANEOUS at 11:49

## 2023-03-26 RX ADMIN — AMLODIPINE BESYLATE 5 MG: 5 TABLET ORAL at 08:36

## 2023-03-26 RX ADMIN — ATORVASTATIN CALCIUM 20 MG: 20 TABLET, FILM COATED ORAL at 20:19

## 2023-03-26 RX ADMIN — ACETAMINOPHEN 650 MG: 325 TABLET, FILM COATED ORAL at 14:17

## 2023-03-26 RX ADMIN — PANTOPRAZOLE SODIUM 40 MG: 40 TABLET, DELAYED RELEASE ORAL at 05:11

## 2023-03-26 RX ADMIN — TAZOBACTAM SODIUM AND PIPERACILLIN SODIUM 3.38 G: 375; 3 INJECTION, SOLUTION INTRAVENOUS at 08:37

## 2023-03-26 RX ADMIN — LOSARTAN POTASSIUM 50 MG: 50 TABLET, FILM COATED ORAL at 08:36

## 2023-03-26 RX ADMIN — MEMANTINE HYDROCHLORIDE 5 MG: 5 TABLET, FILM COATED ORAL at 08:37

## 2023-03-26 RX ADMIN — TAZOBACTAM SODIUM AND PIPERACILLIN SODIUM 3.38 G: 375; 3 INJECTION, SOLUTION INTRAVENOUS at 01:20

## 2023-03-26 RX ADMIN — ACETAMINOPHEN 650 MG: 325 TABLET, FILM COATED ORAL at 10:09

## 2023-03-26 RX ADMIN — LEVOTHYROXINE SODIUM 25 MCG: 0.03 TABLET ORAL at 05:10

## 2023-03-26 RX ADMIN — METOPROLOL TARTRATE 25 MG: 25 TABLET, FILM COATED ORAL at 08:36

## 2023-03-26 RX ADMIN — ENOXAPARIN SODIUM 40 MG: 100 INJECTION SUBCUTANEOUS at 11:49

## 2023-03-26 RX ADMIN — METOPROLOL TARTRATE 25 MG: 25 TABLET, FILM COATED ORAL at 20:19

## 2023-03-26 RX ADMIN — TAZOBACTAM SODIUM AND PIPERACILLIN SODIUM 3.38 G: 375; 3 INJECTION, SOLUTION INTRAVENOUS at 17:08

## 2023-03-26 NOTE — PROGRESS NOTES
Follow-up acute small bowel diverticulitis    Subjective:  No new events overnight. Had a BM overnight. Overall doing ok. Still with some right sided abdominal pain.      Objective:  Afebrile, vitals all within normal limits  General: Awake and alert, no acute distress  Abdomen: Soft, there is still some mild tenderness on the left side, nondistended, no peritoneal signs, not guarding    White blood cell count 8 hemoglobin 12 platelets 88 creatinine 1.1    Assessment and plan:  -Small bowel diverticulitis with probable microperforation, no indication for urgent surgical intervention, continue conservative management.  Full liquid diet as tolerated  On Zosyn, afebrile, white blood cell count normal  Creatinine improving  Lovenox for DVT prophylaxis  Continue to monitor    Mellisa Tejeda MD  General and Endoscopic Surgery  [facility]    [address], [address]  [place], [place], [address]  P: [phone]  F: [phone]

## 2023-03-26 NOTE — PLAN OF CARE
Problem: Adult Inpatient Plan of Care  Goal: Plan of Care Review  Outcome: Ongoing, Not Progressing  Flowsheets (Taken 3/26/2023 1713)  Plan of Care Reviewed With: patient  Outcome Evaluation: Patient is disoriented to time and to situation--varies Her pain resolved with tylenol twice today Antibiotic therapy continued. Educated on call and do not fall which she needed assist each toiletiing She has had multiple stools  Goal: Readiness for Transition of Care  Outcome: Ongoing, Not Progressing   Goal Outcome Evaluation:  Plan of Care Reviewed With: patient           Outcome Evaluation: Patient is disoriented to time and to situation--varies Her pain resolved with tylenol twice today Antibiotic therapy continued. Educated on call and do not fall which she needed assist each toiletiing She has had multiple stools

## 2023-03-26 NOTE — PROGRESS NOTES
Name: Martina Lara ADMIT: 3/21/2023   : 1935  PCP: Sabi Solis MD    MRN: [id number] LOS: 2 days   AGE/SEX: 87 y.o. female  ROOM: [facility]     Subjective   Subjective   Abdominal pain about the same.  No new complaints.    Review of Systems     Objective   Objective   Vital Signs  Temp:  [97.6 °F (36.4 °C)-97.9 °F (36.6 °C)] 97.8 °F (36.6 °C)  Heart Rate:  [73-92] 88  Resp:  [20] 20  BP: (153-178)/(81-97) 178/81  Body mass index is 23.93 kg/m².    Physical Exam  Vitals and nursing note reviewed.   Constitutional:       General: She is not in acute distress.  Cardiovascular:      Rate and Rhythm: Normal rate. Rhythm irregularly irregular.   Pulmonary:      Effort: Pulmonary effort is normal.      Breath sounds: Normal breath sounds.   Abdominal:      General: Bowel sounds are normal.      Palpations: Abdomen is soft.      Tenderness: There is abdominal tenderness.   Musculoskeletal:         General: No swelling.   Skin:     General: Skin is warm and dry.   Neurological:      Mental Status: She is alert. Mental status is at baseline.         Results Review       I reviewed the patient's new clinical results.  Results from last 7 days   Lab Units 23  0546 23  0426 238 23  0531   WBC 10*3/mm3 8.20 8.72 8.92 8.94   HEMOGLOBIN g/dL 12.1 11.9* 12.2 11.5*   PLATELETS 10*3/mm3 88* 80* 90* 93*     Results from last 7 days   Lab Units 23  0546 23  0426 23  0458 23  0531   SODIUM mmol/L 144 141 142 141   POTASSIUM mmol/L 3.9 3.2* 3.3* 3.6   CHLORIDE mmol/L 110* 109* 107 109*   CO2 mmol/L 23.0 24.2 23.5 23.0   BUN mg/dL 10 9 12 16   CREATININE mg/dL 1.15* 1.24* 1.09* 1.11*   GLUCOSE mg/dL 114* 119* 105* 101*   EGFR mL/min/1.73 46.5* 42.5* 49.6* 48.5*     Results from last 7 days   Lab Units 23  0546 23  0426 23  0458 23  0531 23  0545 23  1713   CALCIUM mg/dL 8.3* 8.4* 8.5* 7.9*   < > 9.1   ALBUMIN g/dL  --   --   --   --    --  3.9   MAGNESIUM mg/dL  --   --   --   --   --  2.1    < > = values in this interval not displayed.       Glucose   Date/Time Value Ref Range Status   03/26/2023 0544 119 70 - 130 mg/dL Final     Comment:     Meter: [id number] : [id number] Maren Manzanares NA   03/25/2023 2048 135 (H) 70 - 130 mg/dL Final     Comment:     Meter: [id number] : [id number] Maren Manzanares NA   03/25/2023 1611 176 (H) 70 - 130 mg/dL Final     Comment:     Meter: [id number] : [id number] Albino Sepulveda NA   03/25/2023 1115 167 (H) 70 - 130 mg/dL Final     Comment:     Meter: [id number] : [id number] Albino Sepulveda NA   03/25/2023 0610 108 70 - 130 mg/dL Final     Comment:     Meter: [id number] : [id number] Delontesergio Jimenez NA   03/24/2023 2036 119 70 - 130 mg/dL Final     Comment:     Meter: [id number] : [id number] Maren Manzanares NA   03/24/2023 1552 148 (H) 70 - 130 mg/dL Final     Comment:     Meter: [id number] : [id number] Genaro Bhatti NA       CT SCAN ABDOMEN AND PELVIS  FINDINGS:      Mild nonspecific thickening of the adrenal glands.      The gallbladder is surgically absent.      A 1.1 cm left renal low density on axial image 52 shows greater density   than expected for simple cyst, could be hyperdense cyst, or potentially   solid lesion, further evaluation with renal MRI or ultrasound can be   obtained, interval follow-up can characterize change.      There appears to be an indeterminate hypoenhancing focus, versus volume   averaging, at the pancreatic body, 1 cm on axial image 34, possibly   neoplasm not excluded, MRCP correlation could be obtained, interval   follow-up could characterize change.      Otherwise unremarkable appearance of the liver, spleen, adrenal glands,   pancreas, kidneys, bladder.      No bowel obstruction. Fairly extensive small bowel diverticulosis is   present with small bowel diverticulitis in the left aspect of the   abdomen, evidence of microperforation with small extraluminal gas in  the   left aspect of the abdomen on axial image 66. Colonic diverticula are   seen that do not appear inflamed.      A periumbilical hernia of fat is seen.       Scattered small mesenteric and para-aortic lymph nodes are seen that are   not significant by size criteria.      Abdominal aorta is not aneurysmal. Aortic and other arterial   calcifications are present.      The lung bases are clear.      Degenerative changes are seen in the spine. Chronic appearing L1   compression deformity.          Impression:        1. Critical test result. Small bowel diverticulitis with   microperforation.   2 indeterminate pancreatic and left renal lesions, as described above.         I have personally reviewed all medications:  Scheduled Medications  amLODIPine, 5 mg, Oral, Q24H  atorvastatin, 20 mg, Oral, Nightly  enoxaparin, 40 mg, Subcutaneous, Q24H  insulin lispro, 0-9 Units, Subcutaneous, TID With Meals  levothyroxine, 25 mcg, Oral, Q AM  losartan, 50 mg, Oral, Q24H  memantine, 5 mg, Oral, Daily  metoprolol tartrate, 25 mg, Oral, Q12H  pantoprazole, 40 mg, Oral, Q AM  piperacillin-tazobactam, 3.375 g, Intravenous, Q8H    Infusions   Diet  Diet: Liquid Diets; Full Liquid; Texture: Regular Texture (IDDSI 7); Fluid Consistency: Thin (IDDSI 0)    I have personally reviewed:  [x]  Laboratory   []  Microbiology   [x]  Radiology   []  EKG/Telemetry  []  Cardiology/Vascular        Assessment/Plan     Active Hospital Problems    Diagnosis  POA   • **Acute diverticulitis [K57.92]  Yes   • Hypertension [I10]  Yes   • Type 2 diabetes mellitus with stage 3b chronic kidney disease, without long-term current use of insulin (HCC) [E11.22, N18.32]  Yes   • Thrombocytopenia (HCC) [D69.6]  Yes   • Persistent atrial fibrillation (HCC) [I48.19]  Yes   • Rheumatoid arthritis (HCC) [M06.9]  Yes   • CAD (coronary artery disease) [I25.10]  Yes   • Lesion of left kidney [N28.9]  Yes   • Diverticular disease of intestine with perforation and abscess  "[K57.80]  Yes      Resolved Hospital Problems   No resolved problems to display.       87 y.o. female admitted with acute diverticulitis.    Continue current IV antibiotic.  Defer diet to general surgery.  No plans for surgery as of right now but continue close monitoring.      · Left kidney has a 1.1 cm cyst versus solid lesion on CT scan.  Follow-up renal ultrasound unfortunately did not demonstrate this area and they are recommending renal protocol MRI.  There was also a indeterminate hypoenhancing \"focus\" could not rule out neoplasm.  Suggested MRCP could further evaluate.  CA 19-9 slightly elevated.  Would likely get abdominal MRI as outpatient to evaluate both of these abnormalities when she has recovered from acute illness.  · SCDs for DVT prophylaxis.  · Full code.  · Discussed with patient.  · Anticipate discharge home with family when cleared by consultants.      Kiran Schulte MD  [facility]  03/26/23  09:17 EDT    "

## 2023-03-27 LAB
ANION GAP SERPL CALCULATED.3IONS-SCNC: 9.6 MMOL/L (ref 5–15)
BUN SERPL-MCNC: 9 MG/DL (ref 8–23)
BUN/CREAT SERPL: 7.6 (ref 7–25)
CALCIUM SPEC-SCNC: 8.5 MG/DL (ref 8.6–10.5)
CHLORIDE SERPL-SCNC: 106 MMOL/L (ref 98–107)
CO2 SERPL-SCNC: 22.4 MMOL/L (ref 22–29)
CREAT SERPL-MCNC: 1.18 MG/DL (ref 0.57–1)
DEPRECATED RDW RBC AUTO: 44.2 FL (ref 37–54)
EGFRCR SERPLBLD CKD-EPI 2021: 45.1 ML/MIN/1.73
ERYTHROCYTE [DISTWIDTH] IN BLOOD BY AUTOMATED COUNT: 13.1 % (ref 12.3–15.4)
GLUCOSE BLDC GLUCOMTR-MCNC: 102 MG/DL (ref 70–130)
GLUCOSE BLDC GLUCOMTR-MCNC: 125 MG/DL (ref 70–130)
GLUCOSE BLDC GLUCOMTR-MCNC: 166 MG/DL (ref 70–130)
GLUCOSE BLDC GLUCOMTR-MCNC: 94 MG/DL (ref 70–130)
GLUCOSE SERPL-MCNC: 90 MG/DL (ref 65–99)
HCT VFR BLD AUTO: 35.5 % (ref 34–46.6)
HGB BLD-MCNC: 12.1 G/DL (ref 12–15.9)
MCH RBC QN AUTO: 31.1 PG (ref 26.6–33)
MCHC RBC AUTO-ENTMCNC: 34.1 G/DL (ref 31.5–35.7)
MCV RBC AUTO: 91.3 FL (ref 79–97)
PLATELET # BLD AUTO: 94 10*3/MM3 (ref 140–450)
PMV BLD AUTO: 10.1 FL (ref 6–12)
POTASSIUM SERPL-SCNC: 3.7 MMOL/L (ref 3.5–5.2)
RBC # BLD AUTO: 3.89 10*6/MM3 (ref 3.77–5.28)
SODIUM SERPL-SCNC: 138 MMOL/L (ref 136–145)
WBC NRBC COR # BLD: 5.38 10*3/MM3 (ref 3.4–10.8)

## 2023-03-27 PROCEDURE — 85027 COMPLETE CBC AUTOMATED: CPT | Performed by: HOSPITALIST

## 2023-03-27 PROCEDURE — 25010000002 ENOXAPARIN PER 10 MG: Performed by: HOSPITALIST

## 2023-03-27 PROCEDURE — 63710000001 INSULIN LISPRO (HUMAN) PER 5 UNITS: Performed by: INTERNAL MEDICINE

## 2023-03-27 PROCEDURE — 82962 GLUCOSE BLOOD TEST: CPT

## 2023-03-27 PROCEDURE — 96372 THER/PROPH/DIAG INJ SC/IM: CPT

## 2023-03-27 PROCEDURE — 99232 SBSQ HOSP IP/OBS MODERATE 35: CPT | Performed by: SURGERY

## 2023-03-27 PROCEDURE — 96366 THER/PROPH/DIAG IV INF ADDON: CPT

## 2023-03-27 PROCEDURE — 80048 BASIC METABOLIC PNL TOTAL CA: CPT | Performed by: HOSPITALIST

## 2023-03-27 PROCEDURE — G0378 HOSPITAL OBSERVATION PER HR: HCPCS

## 2023-03-27 PROCEDURE — 25010000002 PIPERACILLIN SOD-TAZOBACTAM PER 1 G: Performed by: HOSPITALIST

## 2023-03-27 RX ADMIN — INSULIN LISPRO 2 UNITS: 100 INJECTION, SOLUTION INTRAVENOUS; SUBCUTANEOUS at 12:36

## 2023-03-27 RX ADMIN — LEVOTHYROXINE SODIUM 25 MCG: 0.03 TABLET ORAL at 06:15

## 2023-03-27 RX ADMIN — MEMANTINE HYDROCHLORIDE 5 MG: 5 TABLET, FILM COATED ORAL at 08:10

## 2023-03-27 RX ADMIN — AMLODIPINE BESYLATE 5 MG: 5 TABLET ORAL at 08:10

## 2023-03-27 RX ADMIN — ATORVASTATIN CALCIUM 20 MG: 20 TABLET, FILM COATED ORAL at 21:55

## 2023-03-27 RX ADMIN — TAZOBACTAM SODIUM AND PIPERACILLIN SODIUM 3.38 G: 375; 3 INJECTION, SOLUTION INTRAVENOUS at 08:10

## 2023-03-27 RX ADMIN — TAZOBACTAM SODIUM AND PIPERACILLIN SODIUM 3.38 G: 375; 3 INJECTION, SOLUTION INTRAVENOUS at 17:17

## 2023-03-27 RX ADMIN — METOPROLOL TARTRATE 25 MG: 25 TABLET, FILM COATED ORAL at 08:10

## 2023-03-27 RX ADMIN — LOSARTAN POTASSIUM 50 MG: 50 TABLET, FILM COATED ORAL at 08:10

## 2023-03-27 RX ADMIN — ENOXAPARIN SODIUM 40 MG: 100 INJECTION SUBCUTANEOUS at 12:36

## 2023-03-27 RX ADMIN — METOPROLOL TARTRATE 25 MG: 25 TABLET, FILM COATED ORAL at 21:55

## 2023-03-27 RX ADMIN — TAZOBACTAM SODIUM AND PIPERACILLIN SODIUM 3.38 G: 375; 3 INJECTION, SOLUTION INTRAVENOUS at 00:25

## 2023-03-27 RX ADMIN — PANTOPRAZOLE SODIUM 40 MG: 40 TABLET, DELAYED RELEASE ORAL at 06:15

## 2023-03-27 NOTE — PROGRESS NOTES
Name: Martina Lara ADMIT: 3/21/2023   : 1935  PCP: Sabi Solis MD    MRN: [id number] LOS: 2 days   AGE/SEX: 87 y.o. female  ROOM: [facility]     Subjective   Subjective   No new issues per patient.  Still some intermittent confusion reported by nursing.    Review of Systems     Objective   Objective   Vital Signs  Temp:  [97.3 °F (36.3 °C)-98.2 °F (36.8 °C)] 98.2 °F (36.8 °C)  Heart Rate:  [] 74  Resp:  [18-20] 18  BP: (150-175)/() 175/109  Body mass index is 23.93 kg/m².    Physical Exam  Vitals and nursing note reviewed.   Constitutional:       General: She is not in acute distress.  Cardiovascular:      Rate and Rhythm: Normal rate. Rhythm irregularly irregular.   Pulmonary:      Effort: Pulmonary effort is normal.      Breath sounds: Normal breath sounds.   Abdominal:      General: Bowel sounds are normal.      Palpations: Abdomen is soft.      Tenderness: There is no abdominal tenderness.   Musculoskeletal:         General: No swelling.   Skin:     General: Skin is warm and dry.   Neurological:      Mental Status: She is alert. Mental status is at baseline.         Results Review       I reviewed the patient's new clinical results.  Results from last 7 days   Lab Units 23  0546 23  0458   WBC 10*3/mm3 5.38 8.20 8.72 8.92   HEMOGLOBIN g/dL 12.1 12.1 11.9* 12.2   PLATELETS 10*3/mm3 94* 88* 80* 90*     Results from last 7 days   Lab Units 23  0523  0546 23  0426 23  0458   SODIUM mmol/L 138 144 141 142   POTASSIUM mmol/L 3.7 3.9 3.2* 3.3*   CHLORIDE mmol/L 106 110* 109* 107   CO2 mmol/L 22.4 23.0 24.2 23.5   BUN mg/dL 9 10 9 12   CREATININE mg/dL 1.18* 1.15* 1.24* 1.09*   GLUCOSE mg/dL 90 114* 119* 105*   EGFR mL/min/1.73 45.1* 46.5* 42.5* 49.6*     Results from last 7 days   Lab Units 23  0522 23  0546 23  0426 23  0458 23  0545 23  1713   CALCIUM mg/dL 8.5* 8.3* 8.4* 8.5*   < > 9.1    ALBUMIN g/dL  --   --   --   --   --  3.9   MAGNESIUM mg/dL  --   --   --   --   --  2.1    < > = values in this interval not displayed.       Glucose   Date/Time Value Ref Range Status   03/27/2023 0639 102 70 - 130 mg/dL Final     Comment:     Meter: [id number] : [id number] Harsha Botello NA   03/26/2023 2144 115 70 - 130 mg/dL Final     Comment:     Meter: [id number] : [id number] Harsha Botello NA   03/26/2023 1546 101 70 - 130 mg/dL Final     Comment:     Meter: [id number] : [id number] Chiara Kerr NA   03/26/2023 1055 163 (H) 70 - 130 mg/dL Final     Comment:     Meter: [id number] : [id number] Chiara Kerr NA   03/26/2023 0544 119 70 - 130 mg/dL Final     Comment:     Meter: [id number] : [id number] Maren Manzanares NA   03/25/2023 2048 135 (H) 70 - 130 mg/dL Final     Comment:     Meter: [id number] : [id number] Maren Ritianna NA   03/25/2023 1611 176 (H) 70 - 130 mg/dL Final     Comment:     Meter: [id number] : [id number] Albino PURDY       CT SCAN ABDOMEN AND PELVIS  FINDINGS:      Mild nonspecific thickening of the adrenal glands.      The gallbladder is surgically absent.      A 1.1 cm left renal low density on axial image 52 shows greater density   than expected for simple cyst, could be hyperdense cyst, or potentially   solid lesion, further evaluation with renal MRI or ultrasound can be   obtained, interval follow-up can characterize change.      There appears to be an indeterminate hypoenhancing focus, versus volume   averaging, at the pancreatic body, 1 cm on axial image 34, possibly   neoplasm not excluded, MRCP correlation could be obtained, interval   follow-up could characterize change.      Otherwise unremarkable appearance of the liver, spleen, adrenal glands,   pancreas, kidneys, bladder.      No bowel obstruction. Fairly extensive small bowel diverticulosis is   present with small bowel diverticulitis in the left aspect of the   abdomen, evidence of  microperforation with small extraluminal gas in the   left aspect of the abdomen on axial image 66. Colonic diverticula are   seen that do not appear inflamed.      A periumbilical hernia of fat is seen.       Scattered small mesenteric and para-aortic lymph nodes are seen that are   not significant by size criteria.      Abdominal aorta is not aneurysmal. Aortic and other arterial   calcifications are present.      The lung bases are clear.      Degenerative changes are seen in the spine. Chronic appearing L1   compression deformity.          Impression:        1. Critical test result. Small bowel diverticulitis with   microperforation.   2 indeterminate pancreatic and left renal lesions, as described above.         I have personally reviewed all medications:  Scheduled Medications  amLODIPine, 5 mg, Oral, Q24H  atorvastatin, 20 mg, Oral, Nightly  enoxaparin, 40 mg, Subcutaneous, Q24H  insulin lispro, 0-9 Units, Subcutaneous, TID With Meals  levothyroxine, 25 mcg, Oral, Q AM  losartan, 50 mg, Oral, Q24H  memantine, 5 mg, Oral, Daily  metoprolol tartrate, 25 mg, Oral, Q12H  pantoprazole, 40 mg, Oral, Q AM  piperacillin-tazobactam, 3.375 g, Intravenous, Q8H    Infusions   Diet  Diet: Liquid Diets; Full Liquid; Texture: Regular Texture (IDDSI 7); Fluid Consistency: Thin (IDDSI 0)    I have personally reviewed:  [x]  Laboratory   []  Microbiology   []  Radiology   []  EKG/Telemetry  []  Cardiology/Vascular        Assessment/Plan     Active Hospital Problems    Diagnosis  POA   • **Acute diverticulitis [K57.92]  Yes   • Hypertension [I10]  Yes   • Type 2 diabetes mellitus with stage 3b chronic kidney disease, without long-term current use of insulin (HCC) [E11.22, N18.32]  Yes   • Thrombocytopenia (HCC) [D69.6]  Yes   • Persistent atrial fibrillation (HCC) [I48.19]  Yes   • Rheumatoid arthritis (HCC) [M06.9]  Yes   • CAD (coronary artery disease) [I25.10]  Yes   • Lesion of left kidney [N28.9]  Yes   • Diverticular  "disease of intestine with perforation and abscess [K57.80]  Yes      Resolved Hospital Problems   No resolved problems to display.       87 y.o. female admitted with acute diverticulitis.    Remains on IV Zosyn day 6.  Labs are stable.  Exam overall seems stable.  Tolerating full liquid diet.  Await general surgery follow-up recommendations regarding advancement of diet and possible discharge home soon.    Blood sugar stable      · Left kidney has a 1.1 cm cyst versus solid lesion on CT scan.  Follow-up renal ultrasound unfortunately did not demonstrate this area and they are recommending renal protocol MRI.  There was also a indeterminate hypoenhancing \"focus\" could not rule out neoplasm.  Suggested MRCP could further evaluate.  CA 19-9 slightly elevated.  Would likely get abdominal MRI as outpatient to evaluate both of these abnormalities when she has recovered from acute illness.  · Lovenox 40 mg SC daily for DVT prophylaxis.  · Full code.  · Discussed with patient and care team on multidisciplinary rounds.  · Anticipate discharge home with family when cleared by consultants.      Kiran Schulte MD  [facility]  03/27/23  09:03 EDT    "

## 2023-03-27 NOTE — PROGRESS NOTES
Chief complaint: Follow-up small bowel diverticulitis    Subjective: Overall seems to feel somewhat better.  Pain is more intermittent.  Tolerating a diet and appetite seems to be improving.  Bowels are functioning.    Review of systems:  Constitutional: Denies fever, chills, change in weight  Respiratory: Denies cough or wheezing    Physical exam:  Afebrile with stable vitals  General: Awake and alert, no distress  Head: Normocephalic, atraumatic  Eyes: Extraocular movements intact, no icterus  Neck: Supple, trachea midline  Respiratory: No use of accessory muscles, good bilateral chest expansion  Gastrointestinal: Soft, minimal tenderness on exam today, no guarding  Extremities: No peripheral edema, no deformity  Skin: Warm and dry    White blood cell count 5000, chemistries okay    Assessment and plan:  -Small bowel diverticulitis with microperforation, clinically improving  -White count has normalized  -Physical exam is the best it has been since she has been here and as such I think advancing her to solid food is reasonable.  If she is able to tolerate this without increased symptoms then I think she will likely be ready for discharge in the next day or 2.  -No plans for operative intervention at this time  -Continue antibiotics and could probably transition to oral antibiotics at home    Itz Miranda MD  General and Endoscopic Surgery  [facility]    [address], [address]  [place], [place], [address]  P: [phone]  F: [phone]

## 2023-03-27 NOTE — PLAN OF CARE
Problem: Hypertension Comorbidity  Goal: Blood Pressure in Desired Range  Outcome: Ongoing, Not Progressing  Intervention: Maintain Blood Pressure Management  Recent Flowsheet Documentation  Taken 3/27/2023 1720 by Carol Cadena RN  Medication Review/Management: medications reviewed  Taken 3/27/2023 1610 by Carol Cadena RN  Medication Review/Management: medications reviewed  Taken 3/27/2023 1444 by Carol Cadena RN  Medication Review/Management: medications reviewed  Taken 3/27/2023 1237 by Carol Cadena RN  Medication Review/Management: medications reviewed  Taken 3/27/2023 0800 by Carol Cadena RN  Medication Review/Management: medications reviewed   Goal Outcome Evaluation:  Plan of Care Reviewed With: patient           Outcome Evaluation: Patient remains disoriented to time specifically the year and to situation. She needs assist to ambulate and with adls as she frequently is incontinent and unsteady at times on her feet. No pain present today. She tolerated the GI diet this evening and reported being excited to eat foot as she had been on a full liquid diet. Her blood pressure was elevated and Dr Schulte was informed but did not want to add any medications.

## 2023-03-27 NOTE — PLAN OF CARE
Problem: Adult Inpatient Plan of Care  Goal: Plan of Care Review  3/27/2023 0719 by Radha Ryder RN  Outcome: Ongoing, Progressing  Flowsheets (Taken 3/27/2023 0719)  Progress: improving  Plan of Care Reviewed With: patient  Outcome Evaluation: Patient is disoriented to time and to situation. Patient is on room air and ambulates to the bathroom with assistance. Administered pm medication and patient slept through the night with no complaints.  3/27/2023 0616 by Radha Ryder RN  Outcome: Ongoing, Progressing  Flowsheets (Taken 3/27/2023 0616)  Progress: improving  Plan of Care Reviewed With: patient  Goal: Patient-Specific Goal (Individualized)  3/27/2023 0719 by Radha Ryder RN  Outcome: Ongoing, Progressing  3/27/2023 0616 by Radha Ryder RN  Outcome: Ongoing, Progressing  Goal: Absence of Hospital-Acquired Illness or Injury  3/27/2023 0719 by Radha Ryder RN  Outcome: Ongoing, Progressing  3/27/2023 0616 by Radha Ryder RN  Outcome: Ongoing, Progressing  Intervention: Identify and Manage Fall Risk  Recent Flowsheet Documentation  Taken 3/27/2023 0600 by Radha Ryder RN  Safety Promotion/Fall Prevention:   assistive device/personal items within reach   clutter free environment maintained   fall prevention program maintained   lighting adjusted   nonskid shoes/slippers when out of bed   room organization consistent   safety round/check completed  Taken 3/27/2023 0400 by Radha Ryder RN  Safety Promotion/Fall Prevention:   assistive device/personal items within reach   clutter free environment maintained   fall prevention program maintained   lighting adjusted   nonskid shoes/slippers when out of bed   room organization consistent   safety round/check completed  Taken 3/27/2023 0200 by Radha Ryder RN  Safety Promotion/Fall Prevention:   assistive device/personal items within reach   clutter free environment maintained   fall prevention program maintained   lighting adjusted   nonskid  shoes/slippers when out of bed   safety round/check completed   room organization consistent  Taken 3/27/2023 0000 by Radha Ryder RN  Safety Promotion/Fall Prevention:   activity supervised   assistive device/personal items within reach   clutter free environment maintained   fall prevention program maintained   lighting adjusted   nonskid shoes/slippers when out of bed   room organization consistent   safety round/check completed  Taken 3/26/2023 2200 by Radha Ryder RN  Safety Promotion/Fall Prevention:   assistive device/personal items within reach   clutter free environment maintained   fall prevention program maintained   lighting adjusted   nonskid shoes/slippers when out of bed   room organization consistent   safety round/check completed  Taken 3/26/2023 2000 by Radha Ryder RN  Safety Promotion/Fall Prevention:   assistive device/personal items within reach   clutter free environment maintained   fall prevention program maintained   lighting adjusted   mobility aid in reach   nonskid shoes/slippers when out of bed   room organization consistent   safety round/check completed  Intervention: Prevent Skin Injury  Recent Flowsheet Documentation  Taken 3/27/2023 0600 by Radha Ryder RN  Body Position: position changed independently  Taken 3/27/2023 0400 by Radha Ryder RN  Body Position: position changed independently  Taken 3/27/2023 0200 by Radha Rydre RN  Body Position: position changed independently  Taken 3/27/2023 0000 by Radha Ryder RN  Body Position: position changed independently  Taken 3/26/2023 2200 by Radha Ryder RN  Body Position: position changed independently  Taken 3/26/2023 2000 by Radha Ryder RN  Body Position: position changed independently  Skin Protection: adhesive use limited  Intervention: Prevent and Manage VTE (Venous Thromboembolism) Risk  Recent Flowsheet Documentation  Taken 3/27/2023 0600 by Radha Ryder RN  Activity Management:   activity adjusted per  tolerance   activity encouraged  Taken 3/27/2023 0400 by Radha Ryder RN  Activity Management: activity adjusted per tolerance  Taken 3/27/2023 0200 by Radha Ryder RN  Activity Management:   activity adjusted per tolerance   activity encouraged  Taken 3/27/2023 0000 by Radha Ryder RN  Activity Management:   activity adjusted per tolerance   activity encouraged  Taken 3/26/2023 2200 by Radha Ryder RN  Activity Management:   activity adjusted per tolerance   activity encouraged  Taken 3/26/2023 2000 by Radha Ryder RN  Activity Management:   activity adjusted per tolerance   activity encouraged  Intervention: Prevent Infection  Recent Flowsheet Documentation  Taken 3/27/2023 0600 by Radha Ryder RN  Infection Prevention:   hand hygiene promoted   rest/sleep promoted   single patient room provided  Taken 3/27/2023 0400 by Radha Ryder RN  Infection Prevention:   hand hygiene promoted   rest/sleep promoted   single patient room provided  Taken 3/27/2023 0200 by Radha Ryder RN  Infection Prevention:   hand hygiene promoted   rest/sleep promoted   single patient room provided  Taken 3/27/2023 0000 by Radha Ryder RN  Infection Prevention:   hand hygiene promoted   rest/sleep promoted   single patient room provided  Taken 3/26/2023 2200 by Radha Ryder RN  Infection Prevention:   hand hygiene promoted   rest/sleep promoted   single patient room provided  Taken 3/26/2023 2000 by Radha Ryder RN  Infection Prevention:   hand hygiene promoted   rest/sleep promoted   single patient room provided  Goal: Optimal Comfort and Wellbeing  3/27/2023 0719 by Radha Ryder RN  Outcome: Ongoing, Progressing  3/27/2023 0616 by Radha Ryder RN  Outcome: Ongoing, Progressing  Goal: Readiness for Transition of Care  3/27/2023 0719 by aRdha Ryder RN  Outcome: Ongoing, Progressing  3/27/2023 0616 by Radha Ryder RN  Outcome: Ongoing, Progressing     Problem: Diabetes Comorbidity  Goal: Blood Glucose Level  Within Targeted Range  3/27/2023 0719 by Radha Ryder RN  Outcome: Ongoing, Progressing  3/27/2023 0616 by Radha Ryder RN  Outcome: Ongoing, Progressing     Problem: Hypertension Comorbidity  Goal: Blood Pressure in Desired Range  3/27/2023 0719 by Radha Ryder RN  Outcome: Ongoing, Progressing  3/27/2023 0616 by Radha Ryder RN  Outcome: Ongoing, Progressing     Problem: Pain Acute  Goal: Acceptable Pain Control and Functional Ability  3/27/2023 0719 by Radha Ryder RN  Outcome: Ongoing, Progressing  3/27/2023 0616 by Radha Ryder RN  Outcome: Ongoing, Progressing  Intervention: Optimize Psychosocial Wellbeing  Recent Flowsheet Documentation  Taken 3/26/2023 2000 by Radha Ryder RN  Diversional Activities: television     Problem: Fall Injury Risk  Goal: Absence of Fall and Fall-Related Injury  3/27/2023 0719 by Radha Ryder RN  Outcome: Ongoing, Progressing  3/27/2023 0616 by Radha Ryder RN  Outcome: Ongoing, Progressing  Intervention: Promote Injury-Free Environment  Recent Flowsheet Documentation  Taken 3/27/2023 0600 by Radha Ryder RN  Safety Promotion/Fall Prevention:   assistive device/personal items within reach   clutter free environment maintained   fall prevention program maintained   lighting adjusted   nonskid shoes/slippers when out of bed   room organization consistent   safety round/check completed  Taken 3/27/2023 0400 by Radha Ryder RN  Safety Promotion/Fall Prevention:   assistive device/personal items within reach   clutter free environment maintained   fall prevention program maintained   lighting adjusted   nonskid shoes/slippers when out of bed   room organization consistent   safety round/check completed  Taken 3/27/2023 0200 by Radha Ryder RN  Safety Promotion/Fall Prevention:   assistive device/personal items within reach   clutter free environment maintained   fall prevention program maintained   lighting adjusted   nonskid shoes/slippers when out of bed    safety round/check completed   room organization consistent  Taken 3/27/2023 0000 by Radha Ryder RN  Safety Promotion/Fall Prevention:   activity supervised   assistive device/personal items within reach   clutter free environment maintained   fall prevention program maintained   lighting adjusted   nonskid shoes/slippers when out of bed   room organization consistent   safety round/check completed  Taken 3/26/2023 2200 by Radha Ryder RN  Safety Promotion/Fall Prevention:   assistive device/personal items within reach   clutter free environment maintained   fall prevention program maintained   lighting adjusted   nonskid shoes/slippers when out of bed   room organization consistent   safety round/check completed  Taken 3/26/2023 2000 by Radha Ryder RN  Safety Promotion/Fall Prevention:   assistive device/personal items within reach   clutter free environment maintained   fall prevention program maintained   lighting adjusted   mobility aid in reach   nonskid shoes/slippers when out of bed   room organization consistent   safety round/check completed     Problem: Skin Injury Risk Increased  Goal: Skin Health and Integrity  3/27/2023 0719 by Radha Ryder RN  Outcome: Ongoing, Progressing  3/27/2023 0616 by Radha Ryder RN  Outcome: Ongoing, Progressing  Intervention: Optimize Skin Protection  Recent Flowsheet Documentation  Taken 3/27/2023 0600 by Radha Ryder RN  Head of Bed (HOB) Positioning: HOB at 20-30 degrees  Taken 3/27/2023 0400 by Radha Ryder RN  Head of Bed (HOB) Positioning: HOB at 20-30 degrees  Taken 3/27/2023 0200 by Radha Ryder RN  Head of Bed (HOB) Positioning: HOB at 30 degrees  Taken 3/27/2023 0000 by Radha Ryder RN  Head of Bed (HOB) Positioning: HOB at 20-30 degrees  Taken 3/26/2023 2200 by Radha Ryder RN  Head of Bed (HOB) Positioning: HOB at 20-30 degrees  Taken 3/26/2023 2000 by Radha Ryder RN  Pressure Reduction Techniques: frequent weight shift encouraged  Head of  Bed (HOB) Positioning: HOB at 20-30 degrees  Pressure Reduction Devices: alternating pressure pump (ADD)  Skin Protection: adhesive use limited   Goal Outcome Evaluation:  Plan of Care Reviewed With: patient        Progress: improving  Outcome Evaluation: Patient is disoriented to time and to situation. Patient is on room air and ambulates to the bathroom with assistance. Administered pm medication and patient slept through the night with no complaints.

## 2023-03-27 NOTE — CASE MANAGEMENT/SOCIAL WORK
Continued Stay Note  [facility]     Patient Name: Martina Lara  MRN: [id number]  Today's Date: 3/27/2023    Admit Date: 3/21/2023    Plan: Requested PT/OT HH vs SNF pending on pts needs   Discharge Plan     Row Name 03/27/23 1719       Plan    Plan Requested PT/OT HH vs SNF pending on pts needs    Patient/Family in Agreement with Plan other (see comments)    Plan Comments Reviewed clinicals and discussed with nursing. PT/OT re-consulted for further assessment and dc planning needs. Josee LAIRD/CCP               Discharge Codes    No documentation.               Expected Discharge Date and Time     Expected Discharge Date Expected Discharge Time    Mar 29, 2023             Snow Barton, RN

## 2023-03-28 ENCOUNTER — READMISSION MANAGEMENT (OUTPATIENT)
Dept: CALL CENTER | Facility: HOSPITAL | Age: 88
End: 2023-03-28
Payer: MEDICARE

## 2023-03-28 VITALS
HEART RATE: 94 BPM | TEMPERATURE: 98.1 F | DIASTOLIC BLOOD PRESSURE: 96 MMHG | RESPIRATION RATE: 18 BRPM | HEIGHT: 67 IN | WEIGHT: 152.8 LBS | OXYGEN SATURATION: 95 % | BODY MASS INDEX: 23.98 KG/M2 | SYSTOLIC BLOOD PRESSURE: 160 MMHG

## 2023-03-28 LAB
ANION GAP SERPL CALCULATED.3IONS-SCNC: 8 MMOL/L (ref 5–15)
BUN SERPL-MCNC: 12 MG/DL (ref 8–23)
BUN/CREAT SERPL: 10.3 (ref 7–25)
CALCIUM SPEC-SCNC: 8.4 MG/DL (ref 8.6–10.5)
CHLORIDE SERPL-SCNC: 109 MMOL/L (ref 98–107)
CO2 SERPL-SCNC: 25 MMOL/L (ref 22–29)
CREAT SERPL-MCNC: 1.16 MG/DL (ref 0.57–1)
DEPRECATED RDW RBC AUTO: 44 FL (ref 37–54)
EGFRCR SERPLBLD CKD-EPI 2021: 46 ML/MIN/1.73
ERYTHROCYTE [DISTWIDTH] IN BLOOD BY AUTOMATED COUNT: 13.1 % (ref 12.3–15.4)
GLUCOSE BLDC GLUCOMTR-MCNC: 105 MG/DL (ref 70–130)
GLUCOSE BLDC GLUCOMTR-MCNC: 152 MG/DL (ref 70–130)
GLUCOSE SERPL-MCNC: 107 MG/DL (ref 65–99)
HCT VFR BLD AUTO: 37.3 % (ref 34–46.6)
HGB BLD-MCNC: 12.1 G/DL (ref 12–15.9)
MCH RBC QN AUTO: 30 PG (ref 26.6–33)
MCHC RBC AUTO-ENTMCNC: 32.4 G/DL (ref 31.5–35.7)
MCV RBC AUTO: 92.6 FL (ref 79–97)
PLATELET # BLD AUTO: 113 10*3/MM3 (ref 140–450)
PMV BLD AUTO: 10.2 FL (ref 6–12)
POTASSIUM SERPL-SCNC: 3.6 MMOL/L (ref 3.5–5.2)
RBC # BLD AUTO: 4.03 10*6/MM3 (ref 3.77–5.28)
SODIUM SERPL-SCNC: 142 MMOL/L (ref 136–145)
WBC NRBC COR # BLD: 5.92 10*3/MM3 (ref 3.4–10.8)

## 2023-03-28 PROCEDURE — 97161 PT EVAL LOW COMPLEX 20 MIN: CPT

## 2023-03-28 PROCEDURE — G0378 HOSPITAL OBSERVATION PER HR: HCPCS

## 2023-03-28 PROCEDURE — 99231 SBSQ HOSP IP/OBS SF/LOW 25: CPT | Performed by: SURGERY

## 2023-03-28 PROCEDURE — 97535 SELF CARE MNGMENT TRAINING: CPT | Performed by: OCCUPATIONAL THERAPIST

## 2023-03-28 PROCEDURE — 25010000002 PIPERACILLIN SOD-TAZOBACTAM PER 1 G: Performed by: HOSPITALIST

## 2023-03-28 PROCEDURE — 85027 COMPLETE CBC AUTOMATED: CPT | Performed by: HOSPITALIST

## 2023-03-28 PROCEDURE — 96366 THER/PROPH/DIAG IV INF ADDON: CPT

## 2023-03-28 PROCEDURE — 80048 BASIC METABOLIC PNL TOTAL CA: CPT | Performed by: HOSPITALIST

## 2023-03-28 PROCEDURE — 82962 GLUCOSE BLOOD TEST: CPT

## 2023-03-28 PROCEDURE — 97165 OT EVAL LOW COMPLEX 30 MIN: CPT | Performed by: OCCUPATIONAL THERAPIST

## 2023-03-28 PROCEDURE — 97116 GAIT TRAINING THERAPY: CPT

## 2023-03-28 RX ORDER — AMOXICILLIN AND CLAVULANATE POTASSIUM 875; 125 MG/1; MG/1
1 TABLET, FILM COATED ORAL EVERY 12 HOURS SCHEDULED
Qty: 14 TABLET | Refills: 0 | Status: SHIPPED | OUTPATIENT
Start: 2023-03-28 | End: 2023-04-04

## 2023-03-28 RX ADMIN — PANTOPRAZOLE SODIUM 40 MG: 40 TABLET, DELAYED RELEASE ORAL at 06:20

## 2023-03-28 RX ADMIN — AMLODIPINE BESYLATE 5 MG: 5 TABLET ORAL at 09:18

## 2023-03-28 RX ADMIN — MEMANTINE HYDROCHLORIDE 5 MG: 5 TABLET, FILM COATED ORAL at 09:18

## 2023-03-28 RX ADMIN — TAZOBACTAM SODIUM AND PIPERACILLIN SODIUM 3.38 G: 375; 3 INJECTION, SOLUTION INTRAVENOUS at 01:26

## 2023-03-28 RX ADMIN — LOSARTAN POTASSIUM 50 MG: 50 TABLET, FILM COATED ORAL at 09:18

## 2023-03-28 RX ADMIN — POTASSIUM CHLORIDE 40 MEQ: 750 TABLET, EXTENDED RELEASE ORAL at 10:20

## 2023-03-28 RX ADMIN — LEVOTHYROXINE SODIUM 25 MCG: 0.03 TABLET ORAL at 06:20

## 2023-03-28 RX ADMIN — TAZOBACTAM SODIUM AND PIPERACILLIN SODIUM 3.38 G: 375; 3 INJECTION, SOLUTION INTRAVENOUS at 09:22

## 2023-03-28 RX ADMIN — METOPROLOL TARTRATE 25 MG: 25 TABLET, FILM COATED ORAL at 09:18

## 2023-03-28 NOTE — PLAN OF CARE
Goal Outcome Evaluation:  Plan of Care Reviewed With: patient        Progress: improving  Outcome Evaluation: pt evaluated for OT. pt admitted w acute diverticulitis. pt lives alone and on 2nd floor apartment but reports her niece will be staying w her some upon DC. pt this date was SBA to CGA w tsf and SBA/mod I w LBD and grooming skills. pt was independent PTA and completed her own grocery shopping as well. pt has good strength in UE and good coordination. pt does not require further OT at this time, pt has a shower chair at home if needed when taking a shower. dc OT and plan to return home w family member.

## 2023-03-28 NOTE — PLAN OF CARE
Goal Outcome Evaluation:  Plan of Care Reviewed With: patient        Progress: improving  Outcome Evaluation: VSS, no C/O pain or nausea, IV antibiotics continued, standby assist to BR, tolerating GI soft diet, will CTM

## 2023-03-28 NOTE — DISCHARGE SUMMARY
Patient Name: Martina Lara  : 1935  MRN: [id number]    Date of Admission: 3/21/2023  Date of Discharge:  3/28/2023  Primary Care Physician: Sabi Solis MD      Chief Complaint:   Abdominal Pain      Discharge Diagnoses     Active Hospital Problems    Diagnosis  POA   • **Acute diverticulitis [K57.92]  Yes   • Hypertension [I10]  Yes   • Type 2 diabetes mellitus with stage 3b chronic kidney disease, without long-term current use of insulin (HCC) [E11.22, N18.32]  Yes   • Thrombocytopenia (HCC) [D69.6]  Yes   • Persistent atrial fibrillation (HCC) [I48.19]  Yes   • Rheumatoid arthritis (HCC) [M06.9]  Yes   • CAD (coronary artery disease) [I25.10]  Yes   • Lesion of left kidney [N28.9]  Yes   • Diverticular disease of intestine with perforation and abscess [K57.80]  Yes      Resolved Hospital Problems   No resolved problems to display.        Hospital Course     Ms. Lara is a 87 y.o. female with a history of HTN, diabetes, A-fib, rheumatoid arthritis and CAD who presented to [facility] initially complaining of abdominal pain.  Please see the admitting history and physical for further details.  She was found to have diverticulitis with microperforation and was admitted to the hospital for further evaluation and treatment.  She was placed on gut rest with IV fluids, analgesics and IV Zosyn.  She was seen by general surgery.  They recommended nonoperative management.  Her diet was very slowly advanced.  She is done relatively well during her hospital stay and is now tolerating a regular low fiber diet.  She is not requiring any pain medications.  No ongoing evidence of sepsis or infection.  Blood pressures been somewhat elevated for which we have increased her metoprolol.  She has been cleared for discharge by general surgery who will follow up with her in the office in 2 weeks with a repeat CT scan.    Left kidney has a 1.1 cm cyst versus solid lesion on CT scan.  Follow-up  "renal ultrasound unfortunately did not demonstrate this area and they are recommending renal protocol MRI.  There was also a indeterminate hypoenhancing \"focus\" could not rule out neoplasm.  Suggested MRCP could further evaluate.  CA 19-9 slightly elevated.  Would likely get abdominal MRI as outpatient to evaluate both of these abnormalities when she has recovered from acute illness.  Will defer this to her outpatient primary care provider.  Discussed with patient who verbalized understanding.      Day of Discharge     Subjective:  Feels about the same.  Tolerating regular diet.    Physical Exam:  Temp:  [97.9 °F (36.6 °C)-98.5 °F (36.9 °C)] 98.1 °F (36.7 °C)  Heart Rate:  [59-88] 81  Resp:  [18] 18  BP: (172-188)/() 175/109  Body mass index is 23.93 kg/m².  Physical Exam  Vitals and nursing note reviewed.   Constitutional:       General: She is not in acute distress.  Cardiovascular:      Rate and Rhythm: Normal rate. Rhythm irregularly irregular.   Pulmonary:      Effort: Pulmonary effort is normal.      Breath sounds: Normal breath sounds.   Abdominal:      General: Bowel sounds are normal.      Palpations: Abdomen is soft.      Tenderness: There is no abdominal tenderness.   Musculoskeletal:         General: No swelling.   Skin:     General: Skin is warm and dry.   Neurological:      Mental Status: She is alert. Mental status is at baseline.         Consultants     Consult Orders (all) (From admission, onward)     Start     Ordered    03/22/23 1528  Inpatient Advance Care Planning Consult  Once        Comments: Pt wishes to complete HCS document. Thanks   Provider:  (Not yet assigned)    03/22/23 1528    03/21/23 1906  Surgery (on-call MD unless specified)  STAT        Specialty:  General Surgery  Provider:  (Not yet assigned)    03/21/23 1905              Procedures     Imaging Results (All)     Procedure Component Value Units Date/Time    CT Abdomen Pelvis With Contrast [[id number]] Collected: 03/23/23 " 1638     Updated: 03/27/23 0806    Narrative:      CT ABDOMEN AND PELVIS WITH IV CONTRAST     HISTORY: 87-year-old female with small bowel diverticulitis. Follow-up.     TECHNIQUE: Radiation dose reduction techniques were utilized, including  automated exposure control and exposure modulation based on body size.   3 mm images were obtained through the abdomen and pelvis after the  administration of IV contrast. Compared with previous CT 03/21/2023.     FINDINGS: There is slightly more focal mesenteric fluid adjacent to the  distal jejunal/proximal ileal diverticulitis. There is a bubble of  extraluminal air within the mesenteric fluid on image 77 and there is  also a bubble of extraluminal air anteriorly which was likely present  previously as well, image 73. There is no free air within the abdomen,  only the contained bubbles of extraluminal air. There has been slight  decrease in the degree of small bowel thickening and there is less  mucosal hyperenhancement. There is no new bowel abnormality and there is  no bowel obstruction. There is a stable small volume of free fluid  within the pelvis. There is no discrete fluid collection. There is  moderate sigmoid diverticulosis without diverticulitis. There is no  acute abnormality involving the liver, spleen, pancreas, adrenals, or  kidneys. There is a stable subtle 1.5 cm cystic lesion partly exophytic  from the pancreatic body measuring approximately 1.6 cm, series 2 image  44. The uterus is surgically absent. There are new small bilateral  pleural effusions and atelectatic changes at both lower lobes. There are  extensive atherosclerotic changes throughout the abdominal aorta with  luminal narrowing.       Impression:      1. There has been decrease in the degree of small bowel thickening and  inflammation, but there is a new bubble of extraluminal air within a  slightly more defined possibly developing mesenteric collection. There  are 2 bubbles of contained  extraluminal air within the mesentery, but  there is no free air.  2. There are new small bilateral pleural effusions and new atelectatic  change at both lower lobes.     This report was finalized on 3/27/2023 8:03 AM by Dr. Karen Pruitt M.D.       US Renal Bilateral [[id number]] Collected: 03/22/23 2022     Updated: 03/22/23 2032    Narrative:      US RENAL BILATERAL-     INDICATIONS: Indeterminate left renal lesion     TECHNIQUE: ULTRASOUND OF THE KIDNEYS AND URINARY BLADDER.     COMPARISON: CT from 03/21/2023     FINDINGS:     The right kidney measures 9.4 centimeters, the left kidney measures 9.2  centimeters.     A cystic appearing focus of the left kidney was measured at 1.6 cm, but  does not appear to correspond in location to the indeterminate lesion on  prior CT exam (which was not definitely visualized on this exam). No  hydronephrosis or echogenic nephrolithiasis.     A left ureteral jet was observed. No right ureteral jet was observed  during the exam. The urinary bladder otherwise appears unremarkable.       Impression:      A cystic appearing focus of the left kidney was measured at 1.6 cm, but  does not appear to correspond in location to the indeterminate lesion on  prior CT exam; further evaluation with renal MRI is advised if not  contraindicated. Otherwise, interval follow-up CT can characterize  change. No hydronephrosis or echogenic nephrolithiasis.        This report was finalized on 3/22/2023 8:29 PM by Dr. Myke Simon M.D.       CT Abdomen Pelvis With Contrast [[id number]] Collected: 03/21/23 1834     Updated: 03/21/23 1846    Narrative:      CT ABDOMEN PELVIS W CONTRAST-     INDICATIONS: Left lower quadrant pain     TECHNIQUE: Radiation dose reduction techniques were utilized, including  automated exposure control and exposure modulation based on body size.  Enhanced ABDOMEN AND PELVIS CT     COMPARISON: None available     FINDINGS:     Mild nonspecific thickening of the adrenal  glands.     The gallbladder is surgically absent.     A 1.1 cm left renal low density on axial image 52 shows greater density  than expected for simple cyst, could be hyperdense cyst, or potentially  solid lesion, further evaluation with renal MRI or ultrasound can be  obtained, interval follow-up can characterize change.     There appears to be an indeterminate hypoenhancing focus, versus volume  averaging, at the pancreatic body, 1 cm on axial image 34, possibly  neoplasm not excluded, MRCP correlation could be obtained, interval  follow-up could characterize change.     Otherwise unremarkable appearance of the liver, spleen, adrenal glands,  pancreas, kidneys, bladder.     No bowel obstruction. Fairly extensive small bowel diverticulosis is  present with small bowel diverticulitis in the left aspect of the  abdomen, evidence of microperforation with small extraluminal gas in the  left aspect of the abdomen on axial image 66. Colonic diverticula are  seen that do not appear inflamed.     A periumbilical hernia of fat is seen.           Scattered small mesenteric and para-aortic lymph nodes are seen that are  not significant by size criteria.     Abdominal aorta is not aneurysmal. Aortic and other arterial  calcifications are present.     The lung bases are clear.     Degenerative changes are seen in the spine. Chronic appearing L1  compression deformity.             Impression:            1. Critical test result. Small bowel diverticulitis with  microperforation.  2 indeterminate pancreatic and left renal lesions, as described above.     Discussed by telephone with Dr. Douglass at 1841, 03/21/2023.     This report was finalized on 3/21/2023 6:43 PM by Dr. Myke Simon M.D.               Pertinent Labs     Results from last 7 days   Lab Units 03/28/23  0532 03/27/23  0522 03/26/23  0546 03/25/23  0426   WBC 10*3/mm3 5.92 5.38 8.20 8.72   HEMOGLOBIN g/dL 12.1 12.1 12.1 11.9*   PLATELETS 10*3/mm3 113* 94* 88* 80*      Results from last 7 days   Lab Units 03/28/23  0532 03/27/23  0522 03/26/23  0546 03/25/23  0426   SODIUM mmol/L 142 138 144 141   POTASSIUM mmol/L 3.6 3.7 3.9 3.2*   CHLORIDE mmol/L 109* 106 110* 109*   CO2 mmol/L 25.0 22.4 23.0 24.2   BUN mg/dL 12 9 10 9   CREATININE mg/dL 1.16* 1.18* 1.15* 1.24*   GLUCOSE mg/dL 107* 90 114* 119*   EGFR mL/min/1.73 46.0* 45.1* 46.5* 42.5*     Results from last 7 days   Lab Units 03/21/23  1713   ALBUMIN g/dL 3.9   BILIRUBIN mg/dL 0.5   ALK PHOS U/L 67   AST (SGOT) U/L 36*   ALT (SGPT) U/L 15     Results from last 7 days   Lab Units 03/28/23  0532 03/27/23  0522 03/26/23  0546 03/25/23  0426 03/22/23  0545 03/21/23  1713   CALCIUM mg/dL 8.4* 8.5* 8.3* 8.4*   < > 9.1   ALBUMIN g/dL  --   --   --   --   --  3.9   MAGNESIUM mg/dL  --   --   --   --   --  2.1    < > = values in this interval not displayed.     Results from last 7 days   Lab Units 03/21/23  1713   LIPASE U/L 40     Results from last 7 days   Lab Units 03/22/23  0909 03/22/23  0545   HSTROP T ng/L 67* 70*           Invalid input(s): LDLCALC  Results from last 7 days   Lab Units 03/21/23  1820   BLOODCX  No growth at 5 days  No growth at 5 days         Test Results Pending at Discharge       Discharge Details        Discharge Medications      New Medications      Instructions Start Date   amoxicillin-clavulanate 875-125 MG per tablet  Commonly known as: Augmentin   1 tablet, Oral, Every 12 Hours Scheduled         Changes to Medications      Instructions Start Date   metoprolol tartrate 25 MG tablet  Commonly known as: LOPRESSOR  What changed: how much to take   25 mg, Oral, 2 Times Daily         Continue These Medications      Instructions Start Date   amLODIPine 5 MG tablet  Commonly known as: NORVASC   5 mg, Oral, Daily      levothyroxine 25 MCG tablet  Commonly known as: SYNTHROID, LEVOTHROID   25 mcg, Oral, Every Early Morning      memantine 5 MG tablet  Commonly known as: NAMENDA   5 mg, Oral, 2 Times Daily       tiZANidine 4 MG tablet  Commonly known as: ZANAFLEX   2 mg, Oral, Every 6 Hours PRN             Allergies   Allergen Reactions   • Codeine Rash       Discharge Disposition:  Home or Self Care      Discharge Diet:  Diet Order   Procedures   • Diet: Gastrointestinal Diets; Fiber-Restricted; Texture: Regular Texture (IDDSI 7); Fluid Consistency: Thin (IDDSI 0)       Discharge Activity:   Activity Instructions     Activity as Tolerated            CODE STATUS:    Code Status and Medical Interventions:   Ordered at: 03/21/23 1954     Code Status (Patient has no pulse and is not breathing):    CPR (Attempt to Resuscitate)     Medical Interventions (Patient has pulse or is breathing):    Full       Future Appointments   Date Time Provider Department Center   4/10/2023 11:10 AM Itz Miranda MD [facility]     Additional Instructions for the Follow-ups that You Need to Schedule     Discharge Follow-up with PCP   As directed       Currently Documented PCP:    Sabi Solis MD    PCP Phone Number:    [phone]     Follow Up Details: 1 to 2 weeks (or sooner if problems)            Follow-up Information     Itz Miranda MD Follow up in 2 week(s).    Specialty: General Surgery  Contact information:  [address]  [facility] [id number] [phone]             Sabi Solis MD .    Specialty: Family Medicine  Why: 1 to 2 weeks (or sooner if problems)  Contact information:  [address]  [facility] [id number] [phone]                         Additional Instructions for the Follow-ups that You Need to Schedule     Discharge Follow-up with PCP   As directed       Currently Documented PCP:    Sabi Solis MD    PCP Phone Number:    [phone]     Follow Up Details: 1 to 2 weeks (or sooner if problems)           Time Spent on Discharge:  Greater than 30 minutes      Kiran Schulte MD  [facility]  03/28/23  11:58 EDT

## 2023-03-28 NOTE — CASE MANAGEMENT/SOCIAL WORK
Case Management Discharge Note      Final Note: home no additional dc orders noted. estela rn/ccp    Provided Post Acute Provider List?: Yes  Post Acute Provider List: Nursing Home, Home Health  Provided Post Acute Provider Quality & Resource List?: Yes  Post Acute Provider Quality and Resource List: Home Health, Nursing Home  Delivered To: Support Person, Patient  Method of Delivery: In person    Selected Continued Care - Admitted Since 3/21/2023     Destination    No services have been selected for the patient.              Durable Medical Equipment    No services have been selected for the patient.              Dialysis/Infusion    No services have been selected for the patient.              Home Medical Care    No services have been selected for the patient.              Therapy    No services have been selected for the patient.              Community Resources    No services have been selected for the patient.              Community & DME    No services have been selected for the patient.                  Transportation Services  Private: Car    Final Discharge Disposition Code: 01 - home or self-care

## 2023-03-28 NOTE — CASE MANAGEMENT/SOCIAL WORK
Continued Stay Note  [facility]     Patient Name: Martina Lara  MRN: [id number]  Today's Date: 3/28/2023    Admit Date: 3/21/2023    Plan: Home via friend, denies any dc needs   Discharge Plan     Row Name 03/28/23 1255       Plan    Plan Home via friend, denies any dc needs    Patient/Family in Agreement with Plan yes    Plan Comments CCP followed up with pt at bedside and discussed dc planning. Reviewed PT notes including pt doing stairs and pt denies HH needs. Her friend will drive her home. Josee RN/CCP    Final Discharge Disposition Code 01 - home or self-care    Final Note home no additional dc orders noted. josee rn/ccp               Discharge Codes    No documentation.               Expected Discharge Date and Time     Expected Discharge Date Expected Discharge Time    Mar 28, 2023             Snow Barton, RN

## 2023-03-28 NOTE — THERAPY DISCHARGE NOTE
Acute Care - Occupational Therapy Discharge  [facility]    Patient Name: Martina Lara  : 1935    MRN: [id number]                              Today's Date: 3/28/2023       Admit Date: 3/21/2023    Visit Dx:     ICD-10-CM ICD-9-CM   1. Acute diverticulitis  K57.92 562.11   2. Leukocytosis, unspecified type  D72.829 288.60   3. Left lower quadrant abdominal pain  R10.32 789.04   4. History of diabetes mellitus  Z86.39 V12.29   5. History of chronic kidney disease  Z87.448 V13.09   6. History of hypertension  Z86.79 V12.59   7. History of rheumatoid arthritis  Z87.39 V13.4     Patient Active Problem List   Diagnosis   • Acute diverticulitis   • Hypertension   • Stage 3b chronic kidney disease (HCC)   • Type 2 diabetes mellitus with stage 3b chronic kidney disease, without long-term current use of insulin (HCC)   • Thrombocytopenia (HCC)   • Persistent atrial fibrillation (HCC)   • Rheumatoid arthritis (HCC)   • CAD (coronary artery disease)   • Lesion of left kidney   • Diverticular disease of intestine with perforation and abscess     History reviewed. No pertinent past medical history.  History reviewed. No pertinent surgical history.   General Information     Row Name 23 1206          OT Time and Intention    Document Type discharge evaluation/summary  -     Mode of Treatment occupational therapy  -     Row Name 23 1206          General Information    Patient Profile Reviewed yes  -     Prior Level of Function independent:;all household mobility;gait;transfer;ADL's;bed mobility;community mobility;cooking;cleaning;driving;shopping  -     Existing Precautions/Restrictions fall  -     Barriers to Rehab none identified  -     Row Name 23 1206          Living Environment    People in Home alone  but reports her niece will be staying with her some  -     Row Name 23 1206          Home Main Entrance    Number of Stairs, Main Entrance --  reports lives on second floor apt  and has two sets of stairs  -     Row Name 03/28/23 1206          Cognition    Orientation Status (Cognition) oriented x 4  -     Row Name 03/28/23 1206          Safety Issues, Functional Mobility    Impairments Affecting Function (Mobility) balance  -           User Key  (r) = Recorded By, (t) = Taken By, (c) = Cosigned By    Initials Name Provider Type     Jade Zamora, OTR Occupational Therapist               Mobility/ADL's     Row Name 03/28/23 1208          Bed Mobility    Supine-Sit [place] (Bed Mobility) standby assist  [facility]     Assistive Device (Bed Mobility) bed rails;head of bed elevated  -     Row Name 03/28/23 1208          Transfers    Transfers stand-sit transfer;sit-stand transfer;bed-chair transfer  -[facility] Name 03/28/23 1208          Bed-Chair Transfer    Bed-Chair [place] (Transfers) standby assist  -[facility] Name 03/28/23 1208          Sit-Stand Transfer    Sit-Stand [place] (Transfers) standby assist  -[facility] Name 03/28/23 1208          Stand-Sit Transfer    Stand-Sit [place] (Transfers) standby assist  [facility] Name 03/28/23 1208          Functional Mobility    Functional Mobility- Ind. Level supervision required;contact guard assist  -     Functional Mobility- Comment pt walked in room and hallway SBA to CGA w no device  -     Row Name 03/28/23 1208          Activities of Daily Living    BADL Assessment/Intervention lower body dressing;grooming  -[facility] Name 03/28/23 1208          Lower Body Dressing Assessment/Training    [place] Level (Lower Body Dressing) lower body dressing skills;doff;don;socks;supervision;modified independence;set up  -     Position (Lower Body Dressing) edge of bed sitting  -     Row Name 03/28/23 1208          Grooming Assessment/Training    [place] Level (Grooming) grooming skills;wash face, hands;set up;modified independence;standby assist  [facility]     Position (Grooming) edge of bed sitting  -            User Key  (r) = Recorded By, (t) = Taken By, (c) = Cosigned By    Initials Name Provider Type     Jade Zamora OTR Occupational Therapist               Obj/Interventions     Row Name 03/28/23 1213          Sensory Assessment (Somatosensory)    Sensory Assessment (Somatosensory) UE sensation intact  -[facility] Name 03/28/23 1213          Vision Assessment/Intervention    Visual Impairment/Limitations WFL  -     Row Name 03/28/23 1213          Range of Motion Comprehensive    General Range of Motion bilateral upper extremity ROM WNL  -     Comment, General Range of Motion B UE 8/8  -KP     [facility] Name 03/28/23 1213          Strength Comprehensive (MMT)    General Manual Muscle Testing (MMT) Assessment no strength deficits identified  -     Comment, General Manual Muscle Testing (MMT) Assessment B UE 4+/5  -[facility] Name 03/28/23 1213          Motor Skills    Motor Skills coordination;functional endurance  -     Coordination WFL  -     Functional Endurance good  -[facility] Name 03/28/23 1213          Balance    Static Sitting Balance modified independence  -     Dynamic Sitting Balance modified independence  -     Position, Sitting Balance sitting edge of bed  -     Static Standing Balance standby assist  -     Dynamic Standing Balance standby assist;contact guard  -     Balance Interventions sitting;standing;sit to stand;supported;static;dynamic;occupation based/functional task  -     Comment, Balance washed face sitting EOB and don/doff socks sitting EOB w no A needed  -           User Key  (r) = Recorded By, (t) = Taken By, (c) = Cosigned By    Initials Name Provider Type     Jade Zamora OTR Occupational Therapist               Goals/Plan     Row Name 03/28/23 1218          Problem Specific Goal 1 (OT)    Problem Specific Goal 1 (OT) ed pt on safety w ADL and tsf and pt demo and verbally understands  -     Time Frame (Problem Specific Goal 1, OT) short  term goal (STG);1 day  -     Progress/Outcome (Problem Specific Goal 1, OT) goal met  -           User Key  (r) = Recorded By, (t) = Taken By, (c) = Cosigned By    Initials Name Provider Type    Jade Lovell, JEFFR Occupational Therapist               Clinical Impression     Row Name 03/28/23 1216          Pain Assessment    Pretreatment Pain Rating 0/10 - no pain  -KP     Posttreatment Pain Rating 0/10 - no pain  -     Row Name 03/28/23 1216          Plan of Care Review    Plan of Care Reviewed With patient  -     Progress improving  -     Outcome Evaluation pt evaluated for OT. pt admitted w acute diverticulitis. pt lives alone and on 2nd floor apartment but reports her niece will be staying w her some upon DC. pt this date was SBA to CGA w tsf and SBA/mod I w LBD and grooming skills. pt was independent PTA and completed her own grocery shopping as well. pt has good strength in UE and good coordination. pt does not require further OT at this time, pt has a shower chair at home if needed when taking a shower. dc OT and plan to return home w family member.  -     Row Name 03/28/23 1216          Therapy Assessment/Plan (OT)    Criteria for Skilled Therapeutic Interventions Met (OT) no problems identified which require skilled intervention  -     Therapy Frequency (OT) evaluation only  -     Row Name 03/28/23 1216          Therapy Plan Review/Discharge Plan (OT)    Anticipated Discharge Disposition (OT) home;home with assist  -     Row Name 03/28/23 1216          Positioning and Restraints    Pre-Treatment Position in bed  -     Post Treatment Position chair  -     In Chair exit alarm on;encouraged to call for assist;call light within reach;reclined;with nsg  -           User Key  (r) = Recorded By, (t) = Taken By, (c) = Cosigned By    Initials Name Provider Type    Jade Lovell, JEFFR Occupational Therapist               Outcome Measures     Row Name 03/28/23 121           How much help from another is currently needed...    Putting on and taking off regular lower body clothing? 4  -     Bathing (including washing, rinsing, and drying) 4  -     Toileting (which includes using toilet bed pan or urinal) 4  -     Putting on and taking off regular upper body clothing 4  -     Taking care of personal grooming (such as brushing teeth) 4  -     Eating meals 4  -     AM-PAC 6 Clicks Score (OT) 24  -     Row Name 03/28/23 0935          How much help from another person do you currently need...    Turning from your back to your side while in flat bed without using bedrails? 4  -     Moving from lying on back to sitting on the side of a flat bed without bedrails? 4  -BH     Moving to and from a bed to a chair (including a wheelchair)? 4  -     Standing up from a chair using your arms (e.g., wheelchair, bedside chair)? 4  -     Climbing 3-5 steps with a railing? 3  -     To walk in hospital room? 3  -     AM-PAC 6 Clicks Score (PT) 22  -     Highest level of mobility 7 --> Walked 25 feet or more  -     Row Name 03/28/23 1219 03/28/23 0935       Functional Assessment    Outcome Measure Options AM-PAC 6 Clicks Daily Activity (OT)  - AM-PAC 6 Clicks Basic Mobility (PT)  -          User Key  (r) = Recorded By, (t) = Taken By, (c) = Cosigned By    Initials Name Provider Type     Jade Zamora OTR Occupational Therapist     Elizabeth Day, PT Physical Therapist              Occupational Therapy Education     Title: PT OT SLP Therapies (Resolved)     Topic: Occupational Therapy (Resolved)     Point: ADL training (Resolved)     Description:   Instruct learner(s) on proper safety adaptation and remediation techniques during self care or transfers.   Instruct in proper use of assistive devices.              Learning Progress Summary           Patient Acceptance, E,D,TB, VU,DU by  at 3/28/2023 1219    Comment: ed pt on role of OT. ed on safety w ADl and  tsf. and pt demo grooming and LBD w no A needed. ed on using shower chair at home as needed in shower for safety if feeling unbalanced.                   Point: Home exercise program (Resolved)     Description:   Instruct learner(s) on appropriate technique for monitoring, assisting and/or progressing therapeutic exercises/activities.              Learning Progress Summary           Patient Acceptance, E,D,TB, VU,DU by  at 3/28/2023 1219    Comment: ed pt on role of OT. ed on safety w ADl and tsf. and pt demo grooming and LBD w no A needed. ed on using shower chair at home as needed in shower for safety if feeling unbalanced.                   Point: Precautions (Resolved)     Description:   Instruct learner(s) on prescribed precautions during self-care and functional transfers.              Learning Progress Summary           Patient Acceptance, E,D,TB, VU,DU by  at 3/28/2023 1219    Comment: ed pt on role of OT. ed on safety w ADl and tsf. and pt demo grooming and LBD w no A needed. ed on using shower chair at home as needed in shower for safety if feeling unbalanced.                   Point: Body mechanics (Resolved)     Description:   Instruct learner(s) on proper positioning and spine alignment during self-care, functional mobility activities and/or exercises.              Learning Progress Summary           Patient Acceptance, E,D,TB, VU,DU by  at 3/28/2023 1219    Comment: ed pt on role of OT. ed on safety w ADl and tsf. and pt demo grooming and LBD w no A needed. ed on using shower chair at home as needed in shower for safety if feeling unbalanced.                               User Key     Initials Effective Dates Name Provider Type Discipline     06/16/21 -  Jade Zamora, OTR Occupational Therapist OT              OT Recommendation and Plan  Therapy Frequency (OT): evaluation only  Plan of Care Review  Plan of Care Reviewed With: patient  Progress: improving  Outcome Evaluation: pt  evaluated for OT. pt admitted w acute diverticulitis. pt lives alone and on 2nd floor apartment but reports her niece will be staying w her some upon DC. pt this date was SBA to CGA w tsf and SBA/mod I w LBD and grooming skills. pt was independent PTA and completed her own grocery shopping as well. pt has good strength in UE and good coordination. pt does not require further OT at this time, pt has a shower chair at home if needed when taking a shower. dc OT and plan to return home w family member.  Plan of Care Reviewed With: patient  Outcome Evaluation: pt evaluated for OT. pt admitted w acute diverticulitis. pt lives alone and on 2nd floor apartment but reports her niece will be staying w her some upon DC. pt this date was SBA to CGA w tsf and SBA/mod I w LBD and grooming skills. pt was independent PTA and completed her own grocery shopping as well. pt has good strength in UE and good coordination. pt does not require further OT at this time, pt has a shower chair at home if needed when taking a shower. dc OT and plan to return home w family member.     Time Calculation:    Time Calculation- OT     Row Name 03/28/23 1221 03/28/23 0936          Time Calculation- OT    OT Start Time 0903  -KP --     OT Stop Time 0919 -KP --     OT Time Calculation (min) 16 min  -KP --     Total Timed Code Minutes- OT 10 minute(s)  -KP --     OT Received On 03/28/23  - --        Timed Charges    38025 - Gait Training Minutes  -- 8  -     43182 - OT Self Care/Mgmt Minutes 10  -KP --        Untimed Charges    OT Eval/Re-eval Minutes 6  -KP --        Total Minutes    Timed Charges Total Minutes 10  -KP 8  -BH     Untimed Charges Total Minutes 6  -KP --      Total Minutes 16  -KP 8  -BH           User Key  (r) = Recorded By, (t) = Taken By, (c) = Cosigned By    Initials Name Provider Type    Jade Lovell, OTR Occupational Therapist     Elizabeth Day, PT Physical Therapist              Therapy Charges for Today      Code Description Service Date Service Provider Modifiers Qty    [id number]  OT SELF CARE/MGMT/TRAIN EA 15 MIN 3/28/2023 Jade Zamora OTR GO 1    [id number]  OT EVAL LOW COMPLEXITY 2 3/28/2023 Jade Zamora OTR GO 1             OT Discharge Summary  Anticipated Discharge Disposition (OT): home, home with assist  Reason for Discharge: All goals achieved, At baseline function  Outcomes Achieved: Able to achieve all goals within established timeline, Discharge from facility occurred on same date as evluation  Discharge Destination: Home, Home with assist    NEELAM Clayton  3/28/2023

## 2023-03-28 NOTE — PROGRESS NOTES
Follow-up small bowel diverticulitis    Subjective:  Overall she is feeling well, tolerating solid foods with minimal, if any discomfort.  Bowels are functioning.  She has been up and ambulating adequately.    Objective:  Afebrile, vitals are stable  General: Awake and alert, no distress  Abdomen: Soft, benign, minimal, if any, tenderness, certainly no guarding    Labs reviewed, white count remains normal    Assessment and plan:  -Acute small bowel diverticulitis with microperforation, clinically improved with conservative measures  -Now tolerating regular diet, okay for discharge from my standpoint  -Follow-up with me in the office in a couple of weeks, we will plan to repeat CT at that point    Itz Miranda MD  General and Endoscopic Surgery  [facility]    [address], [address]  [place], [place], [address]  P: [phone]  F: [phone]

## 2023-03-28 NOTE — PLAN OF CARE
Goal Outcome Evaluation:              Outcome Evaluation: Patient is alert and oriented x 4, calm, pleasant, cooperative. She ambulated in hallway with PT, was able to go up and down a set of stairs. Patient has no complaints of pain. No surgical intervention at this time.

## 2023-03-28 NOTE — PLAN OF CARE
Goal Outcome Evaluation:  Plan of Care Reviewed With: patient           Outcome Evaluation: Pt is an 88 yo F admitted from home with abdominal pain. Work-up revealed acute diverticulitis with probably microperforation. Current plans to treat nonsurgically. Pt lives alone in a 2nd floor apartment, reports independence at BL with no AD but has both a quad cane and single point cane to use if needed. Pt reports doing 2 flights of stairs daily with no difficulty and denies recent falls hx. Pt states she plans for her niece to come stay with her for a few days at DC. Pt presents to PT with minimal functional deficits, appearing to be at BL. Pt transferred to EOB with SBA, STS with SBA, and ambulated 200ft with SBA-CGA. Initially using HHA x1, progressing to no assist. Intermittently reaching out for additional UE support, but states she doesn't really need it, just a habit from home. Pt did 8 steps with 1 HR and 1 HHA, states she normally holds onto 2 HRs at home and demo'd overall safety/independence with steps today. Pt returned to room and agreeable to sitting UIC, left with needs met. Pt safe to DC home with family assist and HHPT. PT will sign-off, d/w CCP.

## 2023-03-28 NOTE — THERAPY EVALUATION
Patient Name: Martina Lara  : 1935    MRN: [id number]                              Today's Date: 3/28/2023       Admit Date: 3/21/2023    Visit Dx:     ICD-10-CM ICD-9-CM   1. Acute diverticulitis  K57.92 562.11   2. Leukocytosis, unspecified type  D72.829 288.60   3. Left lower quadrant abdominal pain  R10.32 789.04   4. History of diabetes mellitus  Z86.39 V12.29   5. History of chronic kidney disease  Z87.448 V13.09   6. History of hypertension  Z86.79 V12.59   7. History of rheumatoid arthritis  Z87.39 V13.4     Patient Active Problem List   Diagnosis   • Acute diverticulitis   • Hypertension   • Stage 3b chronic kidney disease (HCC)   • Type 2 diabetes mellitus with stage 3b chronic kidney disease, without long-term current use of insulin (HCC)   • Thrombocytopenia (HCC)   • Persistent atrial fibrillation (HCC)   • Rheumatoid arthritis ([facility])   • CAD (coronary artery disease)   • Lesion of left kidney   • Diverticular disease of intestine with perforation and abscess     History reviewed. No pertinent past medical history.  History reviewed. No pertinent surgical history.   General Information     [facility] Name 23          Physical Therapy Time and Intention    Document Type evaluation;discharge evaluation/summary  -     Mode of Treatment physical therapy  -     Row Name 23          General Information    Patient Profile Reviewed yes  -     Prior Level of Function independent:;gait;transfer;bed mobility  -     Existing Precautions/Restrictions fall  -     Barriers to Rehab none identified  -     Row Name 23          Living Environment    People in Home alone  Niece to stay with pt at [place]  -     Row Name 23          Stairs Within Home, Primary    Stairs, Within Home, Primary Lives in 2nd floor apartment - reports 2 flights of stairs she does daily  -     Row Name 23          Cognition    Orientation Status (Cognition) oriented x 4  -      Row Name 03/28/23 0927          Safety Issues, Functional Mobility    Impairments Affecting Function (Mobility) balance  -           User Key  (r) = Recorded By, (t) = Taken By, (c) = Cosigned By    Initials Name Provider Type     Elizabeth Day PT Physical Therapist               Mobility     Row Name 03/28/23 0929          Bed Mobility    Bed Mobility supine-sit  -     Supine-Sit [place] (Bed Mobility) standby assist  -     Assistive Device (Bed Mobility) bed rails;head of bed elevated  -     Row Name 03/28/23 0929          Sit-Stand Transfer    Sit-Stand [place] (Transfers) standby assist  -     Assistive Device (Sit-Stand Transfers) other (see comments)  No AD  -     Row Name 03/28/23 0929          Gait/Stairs (Locomotion)    [place] Level (Gait) standby assist;contact guard  -     Assistive Device (Gait) other (see comments)  HHA x1 progressing to no assist  -     Distance in Feet (Gait) 200ft  -     Deviations/Abnormal Patterns (Gait) gait speed decreased;sonny decreased  -     Bilateral Gait Deviations forward flexed posture  -     [place] Level (Stairs) contact guard;verbal cues  -     Handrail Location (Stairs) both sides  -     Number of Steps (Stairs) 8  -     Ascending Technique (Stairs) step-over-step  -     Descending Technique (Stairs) step-to-step  -     Comment, (Gait/Stairs) Intermittently reaching out for additional UE support but reports is a habit from home, does not normally use AD  -           User Key  (r) = Recorded By, (t) = Taken By, (c) = Cosigned By    Initials Name Provider Type     Elizabeth Day PT Physical Therapist               Obj/Interventions     Row Name 03/28/23 0931          Range of Motion Comprehensive    General Range of Motion bilateral lower extremity ROM WFL  -     Row Name 03/28/23 0931          Strength Comprehensive (MMT)    General Manual Muscle Testing (MMT) Assessment no strength deficits  identified  -     Comment, General Manual Muscle Testing (MMT) Assessment BLE grossly 4+/5  -     Row Name 03/28/23 0931          Balance    Balance Assessment sitting static balance;sitting dynamic balance;standing static balance;standing dynamic balance  -     Static Sitting Balance modified independence  -     Dynamic Sitting Balance modified independence  -     Position, Sitting Balance unsupported;sitting edge of bed  -     Static Standing Balance standby assist  -     Dynamic Standing Balance standby assist;contact guard  -     Position/Device Used, Standing Balance supported;unsupported;other (see comments)  HHA x1 progressing to no assist  -     Balance Interventions sitting;standing;sit to stand;supported;static;dynamic  -     Row Name 03/28/23 0931          Sensory Assessment (Somatosensory)    Sensory Assessment (Somatosensory) LE sensation intact  -           User Key  (r) = Recorded By, (t) = Taken By, (c) = Cosigned By    Initials Name Provider Type     Elizabeth Day, PT Physical Therapist               Goals/Plan    No documentation.                Clinical Impression     Row Name 03/28/23 0931          Pain    Pretreatment Pain Rating 0/10 - no pain  -     Posttreatment Pain Rating 0/10 - no pain  -     Row Name 03/28/23 0931          Plan of Care Review    Plan of Care Reviewed With patient  -     Outcome Evaluation Pt is an 88 yo F admitted from home with abdominal pain. Work-up revealed acute diverticulitis with probably microperforation. Current plans to treat nonsurgically. Pt lives alone in a 2nd floor apartment, reports independence at BL with no AD but has both a quad cane and single point cane to use if needed. Pt reports doing 2 flights of stairs daily with no difficulty and denies recent falls hx. Pt states she plans for her niece to come stay with her for a few days at [place]. Pt presents to PT with minimal functional deficits, appearing to be at BL. Pt  transferred to EOB with SBA, STS with SBA, and ambulated 200ft with SBA-CGA. Initially using HHA x1, progressing to no assist. Intermittently reaching out for additional UE support, but states she doesn't really need it, just a habit from home. Pt did 8 steps with 1 HR and 1 HHA, states she normally holds onto 2 HRs at home and demo'd overall safety/independence with steps today. Pt returned to room and agreeable to sitting UIC, left with needs met. Pt safe to DC home with family assist and HHPT. PT will sign-off, d/w CCP.  -     Row Name 03/28/23 0931          Therapy Assessment/Plan (PT)    Criteria for Skilled Interventions Met (PT) no problems identified which require skilled intervention;no  -     Therapy Frequency (PT) evaluation only  -     Row Name 03/28/23 0931          Vital Signs    O2 Delivery Pre Treatment room air  -     O2 Delivery Intra Treatment room air  -     O2 Delivery Post Treatment room air  -     Row Name 03/28/23 0931          Positioning and Restraints    Pre-Treatment Position in bed  -     Post Treatment Position chair  -     In Chair reclined;with nsg;call light within reach;encouraged to call for assist;exit alarm on  -           User Key  (r) = Recorded By, (t) = Taken By, (c) = Cosigned By    Initials Name Provider Type     Elizabeth Day, PT Physical Therapist               Outcome Measures     Row Name 03/28/23 0935          How much help from another person do you currently need...    Turning from your back to your side while in flat bed without using bedrails? 4  -BH     Moving from lying on back to sitting on the side of a flat bed without bedrails? 4  -BH     Moving to and from a bed to a chair (including a wheelchair)? 4  -BH     Standing up from a chair using your arms (e.g., wheelchair, bedside chair)? 4  -BH     Climbing 3-5 steps with a railing? 3  -BH     To walk in hospital room? 3  -BH     AM-PAC 6 Clicks Score (PT) 22  -     Highest level of  mobility 7 --> Walked 25 feet or more  -     Row Name 03/28/23 0935          Functional Assessment    Outcome Measure Options AM-PAC 6 Clicks Basic Mobility (PT)  -           User Key  (r) = Recorded By, (t) = Taken By, (c) = Cosigned By    Initials Name Provider Type     Elizabeth Day PT Physical Therapist                             Physical Therapy Education     Title: PT OT SLP Therapies (Done)     Topic: Physical Therapy (Done)     Point: Mobility training (Done)     Learning Progress Summary           Patient Acceptance, E,TB,D, VU by  at 3/28/2023 0935                   Point: Home exercise program (Done)     Learning Progress Summary           Patient Acceptance, E,TB,D, VU by  at 3/28/2023 0935                   Point: Body mechanics (Done)     Learning Progress Summary           Patient Acceptance, E,TB,D, VU by  at 3/28/2023 0935                   Point: Precautions (Done)     Learning Progress Summary           Patient Acceptance, E,TB,D, VU by  at 3/28/2023 0935                               User Key     Initials Effective Dates Name Provider Type Discipline     04/08/22 -  Elizabeth Day PT Physical Therapist PT              PT Recommendation and Plan     Plan of Care Reviewed With: patient  Outcome Evaluation: Pt is an 88 yo F admitted from home with abdominal pain. Work-up revealed acute diverticulitis with probably microperforation. Current plans to treat nonsurgically. Pt lives alone in a 2nd floor apartment, reports independence at BL with no AD but has both a quad cane and single point cane to use if needed. Pt reports doing 2 flights of stairs daily with no difficulty and denies recent falls hx. Pt states she plans for her niece to come stay with her for a few days at [place]. Pt presents to PT with minimal functional deficits, appearing to be at BL. Pt transferred to EOB with SBA, STS with SBA, and ambulated 200ft with SBA-CGA. Initially using HHA x1, progressing to no assist.  Intermittently reaching out for additional UE support, but states she doesn't really need it, just a habit from home. Pt did 8 steps with 1 HR and 1 HHA, states she normally holds onto 2 HRs at home and demo'd overall safety/independence with steps today. Pt returned to room and agreeable to sitting UIC, left with needs met. Pt safe to DC home with family assist and HHPT. PT will sign-off, d/w CCP.     Time Calculation:    PT Charges     Row Name 03/28/23 0936             Time Calculation    Start Time 0902  -      Stop Time 0919  -      Time Calculation (min) 17 min  -      PT Received On 03/28/23  -         Time Calculation- PT    Total Timed Code Minutes- PT 15 minute(s)  -         Timed Charges    62958 - Gait Training Minutes  8  -      56734 - PT Therapeutic Activity Minutes 7  -BH         Untimed Charges    PT Eval/Re-eval Minutes 5  -         Total Minutes    Timed Charges Total Minutes 15  -      Untimed Charges Total Minutes 5  -       Total Minutes 20  -BH            User Key  (r) = Recorded By, (t) = Taken By, (c) = Cosigned By    Initials Name Provider Type     Elizabeth Day, PT Physical Therapist              Therapy Charges for Today     Code Description Service Date Service Provider Modifiers Qty    03707898251 HC GAIT TRAINING EA 15 MIN 3/28/2023 Elizabeth Day, PT GP 1    [id number] HC PT EVAL LOW COMPLEXITY 3 3/28/2023 Elizabeth Day, PT GP 1          PT G-Codes  Outcome Measure Options: AM-PAC 6 Clicks Basic Mobility (PT)  AM-PAC 6 Clicks Score (PT): 22  PT Discharge Summary  Anticipated Discharge Disposition (PT): home with assist, home with home health    Elizabeth Day PT  3/28/2023

## 2023-03-29 ENCOUNTER — NURSE TRIAGE (OUTPATIENT)
Dept: CALL CENTER | Facility: HOSPITAL | Age: 88
End: 2023-03-29
Payer: MEDICARE

## 2023-03-29 NOTE — OUTREACH NOTE
Prep Survey    Flowsheet Row Responses   [facility] patient discharged from? [place]   Is LACE score < 7 ? No   Eligibility Readm Mgmt   Discharge diagnosis Acute diverticulitis   Does the patient have one of the following disease processes/diagnoses(primary or secondary)? Other   Does the patient have Home health ordered? No   Is there a DME ordered? No   Prep survey completed? Yes          Selina BELL - Registered Nurse

## 2023-03-29 NOTE — TELEPHONE ENCOUNTER
"Caller asking who the Kidney doctor was who saw her while she was in the hospital. Reviewed chart and could not find a Nephrologist on this visit. States she will ask Dr. Miranda tomorrow.     Reason for Disposition  • Health Information question, no triage required and triager able to answer question    Additional Information  • Negative: [1] Caller is not with the adult (patient) AND [2] reporting urgent symptoms  • Negative: Lab result questions  • Negative: Medication questions  • Negative: Caller can't be reached by phone  • Negative: Caller has already spoken to PCP or another triager  • Negative: RN needs further essential information from caller in order to complete triage  • Negative: Requesting regular office appointment  • Negative: [1] Caller requesting NON-URGENT health information AND [2] PCP's office is the best resource    Answer Assessment - Initial Assessment Questions  1. REASON FOR CALL or QUESTION: \"What is your reason for calling today?\" or \"How can I best help you?\" or \"What question do you have that I can help answer?\"      Caller asking who the kidney doctor was that saw her while she was in hospital.    Protocols used: INFORMATION ONLY CALL - NO TRIAGE-ADULT-      "

## 2023-04-04 ENCOUNTER — READMISSION MANAGEMENT (OUTPATIENT)
Dept: CALL CENTER | Facility: HOSPITAL | Age: 88
End: 2023-04-04
Payer: MEDICARE

## 2023-04-04 NOTE — OUTREACH NOTE
"Medical Week 1 Survey    Flowsheet Row Responses   [facility] patient discharged from? [place]   Does the patient have one of the following disease processes/diagnoses(primary or secondary)? Other   Week 1 attempt successful? Yes   Call start time 1731   Call end time 1735   Discharge diagnosis Acute diverticulitis   Meds reviewed with patient/caregiver? Yes   Is the patient having any side effects they believe may be caused by any medication additions or changes? No   Does the patient have all medications ordered at discharge? Yes   Is the patient taking all medications as directed (includes completed medication regime)? Yes   What is preventing the patient from taking all medications as directed? Other   Medication comments States she was short 3 pills and needs to go pick them up   Does the patient have a primary care provider?  Yes   Does the patient have an appointment with their PCP within 7 days of discharge? No   What is preventing the patient from scheduling follow up appointments within 7 days of discharge? Haven't had time   Comments Has appt with Dr. Miranda 4/10 @11:10.   Psychosocial issues? No   Did the patient receive a copy of their discharge instructions? Yes   Nursing interventions Reviewed instructions with patient   What is the patient's perception of their health status since discharge? Improving   Is the patient/caregiver able to teach back signs and symptoms related to disease process for when to call PCP? Yes   Is the patient/caregiver able to teach back signs and symptoms related to disease process for when to call 911? Yes   Is the patient/caregiver able to teach back the hierarchy of who to call/visit for symptoms/problems? PCP, Specialist, Home health nurse, Urgent Care, ED, 911 Yes   If the patient is a current smoker, are they able to teach back resources for cessation? Not a smoker   Additional teach back comments States she is doing \"pretty well\"   Week 1 call completed? Yes "   Graduated Yes   Graduated/Revoked comments Denies questions or needs at this time.           Tracy MAYERS - Licensed Nurse

## 2023-04-10 ENCOUNTER — OFFICE VISIT (OUTPATIENT)
Dept: SURGERY | Facility: CLINIC | Age: 88
End: 2023-04-10
Payer: MEDICARE

## 2023-04-10 VITALS — WEIGHT: 154 LBS | HEIGHT: 67 IN | BODY MASS INDEX: 24.17 KG/M2

## 2023-04-10 DIAGNOSIS — K57.00 DIVERTICULITIS OF SMALL INTESTINE WITH PERFORATION WITHOUT ABSCESS OR BLEEDING: Primary | ICD-10-CM

## 2023-04-10 PROCEDURE — 99212 OFFICE O/P EST SF 10 MIN: CPT | Performed by: SURGERY

## 2023-04-10 PROCEDURE — 1159F MED LIST DOCD IN RCRD: CPT | Performed by: SURGERY

## 2023-04-10 PROCEDURE — 1160F RVW MEDS BY RX/DR IN RCRD: CPT | Performed by: SURGERY

## 2023-04-10 RX ORDER — FUROSEMIDE 20 MG/1
20 TABLET ORAL AS NEEDED
COMMUNITY

## 2023-04-10 RX ORDER — NITROGLYCERIN 0.4 MG/1
0.4 TABLET SUBLINGUAL
COMMUNITY

## 2023-04-10 NOTE — PROGRESS NOTES
Follow-up acute small bowel diverticulitis    Subjective:  Overall she is feeling fairly well, some loose stools and diarrhea but really no significant abdominal pain.  Appetite is okay.    Objective:  BMI 24.1  General: Awake and alert without distress  Abdomen: Soft and benign, essentially nontender    Assessment and plan:  -Small bowel diverticulitis with perforation, clinically improved and has now completed a course of antibiotics  -I have requested repeat CT to assess for radiographic improvement.  No plans for operative intervention at this time.  We will contact the patient with CT results once available.    Itz Miranda MD  General and Endoscopic Surgery  [facility]    [address], [address]  [place], [place], [address]  P: [phone]  F: [phone]

## 2023-04-10 NOTE — LETTER
April 10, 2023     Sabi Solis MD  [address]  [address]  [facility] [address]    Patient: Martina Lara   YOB: 1935   Date of Visit: 4/10/2023       Dear Dr. Will MD:    Thank you for referring Martina Lara to me for evaluation. Below are the relevant portions of my assessment and plan of care.    If you have questions, please do not hesitate to call me. I look forward to following Martina along with you.         Sincerely,        Itz Miranda MD        CC: No Recipients    Itz Miranda MD  04/10/23 1339  Signed  Follow-up acute small bowel diverticulitis    Subjective:  Overall she is feeling fairly well, some loose stools and diarrhea but really no significant abdominal pain.  Appetite is okay.    Objective:  BMI 24.1  General: Awake and alert without distress  Abdomen: Soft and benign, essentially nontender    Assessment and plan:  -Small bowel diverticulitis with perforation, clinically improved and has now completed a course of antibiotics  -I have requested repeat CT to assess for radiographic improvement.  No plans for operative intervention at this time.  We will contact the patient with CT results once available.    Itz Miranda MD  General and Endoscopic Surgery  [facility]    [address], [address]  [place], [place], [address]  P: [phone]  F: [phone]      
Statement Selected

## 2023-04-11 ENCOUNTER — TELEPHONE (OUTPATIENT)
Dept: SURGERY | Facility: CLINIC | Age: 88
End: 2023-04-11
Payer: MEDICARE

## 2023-04-11 NOTE — TELEPHONE ENCOUNTER
Pts caretaker called and wanted to ask, you have ordered a CT abd/pelvis for diverticulitis.    Her PCP today is ordering an MRI to look at the pancreas and kidneys.    Before they schedule the pt for the CT, they are wanting to ask if the MRI would be sufficient for what you need to see/know.  Or will pt also still need the CT?    Caretaker: Kamila Liz [phone]  (she will be traveling back out this afternoon - ok to )

## 2023-04-11 NOTE — TELEPHONE ENCOUNTER
CT is better for looking at the colon and for comparison to her prior studies.  MRI looking at the pancreas and kidneys is unlikely to show us exactly what we are looking for.  Would recommend trying to separate these tests by at least 10 days.